# Patient Record
Sex: FEMALE | Race: WHITE | NOT HISPANIC OR LATINO | URBAN - METROPOLITAN AREA
[De-identification: names, ages, dates, MRNs, and addresses within clinical notes are randomized per-mention and may not be internally consistent; named-entity substitution may affect disease eponyms.]

---

## 2023-09-11 PROBLEM — M21.612 BUNION OF GREAT TOE OF LEFT FOOT: Status: ACTIVE | Noted: 2023-09-11

## 2023-09-11 PROBLEM — R29.898 LIMB WEAKNESS: Status: ACTIVE | Noted: 2023-09-11

## 2023-09-11 PROBLEM — G43.109 MIGRAINE WITH AURA AND WITHOUT STATUS MIGRAINOSUS, NOT INTRACTABLE: Status: ACTIVE | Noted: 2023-09-11

## 2023-09-11 PROBLEM — N91.0 DELAYED MENSES: Status: ACTIVE | Noted: 2023-09-11

## 2023-09-11 PROBLEM — L70.0 ACNE VULGARIS: Status: ACTIVE | Noted: 2023-09-11

## 2023-09-11 PROBLEM — M19.079 ARTHRITIS OF BIG TOE: Status: ACTIVE | Noted: 2023-09-11

## 2023-09-11 PROBLEM — R90.89 ABNORMAL BRAIN MRI: Status: ACTIVE | Noted: 2023-09-11

## 2023-09-11 PROBLEM — J45.990 EXERCISE-INDUCED ASTHMA (HHS-HCC): Status: ACTIVE | Noted: 2023-09-11

## 2023-09-11 PROBLEM — R40.0 DAYTIME SLEEPINESS: Status: ACTIVE | Noted: 2023-09-11

## 2023-09-11 PROBLEM — R20.0 NUMBNESS OF ARM: Status: ACTIVE | Noted: 2023-09-11

## 2023-09-11 PROBLEM — F32.9 REACTIVE DEPRESSION: Status: ACTIVE | Noted: 2023-09-11

## 2023-09-11 PROBLEM — I80.9 SUPERFICIAL THROMBOPHLEBITIS: Status: ACTIVE | Noted: 2023-09-11

## 2023-09-11 PROBLEM — B99.9 INFECTION: Status: ACTIVE | Noted: 2023-09-11

## 2023-09-11 RX ORDER — ETONOGESTREL 68 MG/1
IMPLANT SUBCUTANEOUS
COMMUNITY

## 2023-09-11 RX ORDER — ALBUTEROL SULFATE 90 UG/1
2 AEROSOL, METERED RESPIRATORY (INHALATION)
COMMUNITY
End: 2023-10-11 | Stop reason: WASHOUT

## 2023-09-11 RX ORDER — SERTRALINE HYDROCHLORIDE 200 MG/1
1 CAPSULE ORAL DAILY
COMMUNITY
End: 2023-10-11 | Stop reason: WASHOUT

## 2023-10-10 ENCOUNTER — TELEPHONE (OUTPATIENT)
Dept: OBSTETRICS AND GYNECOLOGY | Facility: CLINIC | Age: 23
End: 2023-10-10
Payer: COMMERCIAL

## 2023-10-10 NOTE — TELEPHONE ENCOUNTER
Pt called into office stating she has been experiencing Dysuria and frequency. Pt also has a new partner and needs an STD screening. LPN scheduled pt apt for 10/11 for dip and std screening.

## 2023-10-11 ENCOUNTER — OFFICE VISIT (OUTPATIENT)
Dept: OBSTETRICS AND GYNECOLOGY | Facility: CLINIC | Age: 23
End: 2023-10-11
Payer: COMMERCIAL

## 2023-10-11 VITALS
BODY MASS INDEX: 24.63 KG/M2 | HEIGHT: 63 IN | DIASTOLIC BLOOD PRESSURE: 62 MMHG | WEIGHT: 139 LBS | SYSTOLIC BLOOD PRESSURE: 118 MMHG

## 2023-10-11 DIAGNOSIS — N92.6 IRREGULAR MENSES: ICD-10-CM

## 2023-10-11 DIAGNOSIS — Z72.89 OTHER PROBLEMS RELATED TO LIFESTYLE: ICD-10-CM

## 2023-10-11 DIAGNOSIS — R30.0 DYSURIA: ICD-10-CM

## 2023-10-11 DIAGNOSIS — Z12.4 SCREENING FOR CERVICAL CANCER: Primary | ICD-10-CM

## 2023-10-11 DIAGNOSIS — Z11.3 SCREEN FOR SEXUALLY TRANSMITTED DISEASES: ICD-10-CM

## 2023-10-11 DIAGNOSIS — Z30.09 CONTRACEPTIVE EDUCATION: ICD-10-CM

## 2023-10-11 LAB
POC APPEARANCE, URINE: CLEAR
POC BILIRUBIN, URINE: NEGATIVE
POC BLOOD, URINE: ABNORMAL
POC COLOR, URINE: YELLOW
POC GLUCOSE, URINE: NEGATIVE MG/DL
POC KETONES, URINE: NEGATIVE MG/DL
POC LEUKOCYTES, URINE: ABNORMAL
POC NITRITE,URINE: NEGATIVE
POC PH, URINE: 6 PH
POC PROTEIN, URINE: ABNORMAL MG/DL
POC SPECIFIC GRAVITY, URINE: >=1.03
POC UROBILINOGEN, URINE: 0.2 EU/DL
PREGNANCY TEST URINE, POC: NEGATIVE

## 2023-10-11 PROCEDURE — 81025 URINE PREGNANCY TEST: CPT | Performed by: OBSTETRICS & GYNECOLOGY

## 2023-10-11 PROCEDURE — 99214 OFFICE O/P EST MOD 30 MIN: CPT | Performed by: OBSTETRICS & GYNECOLOGY

## 2023-10-11 PROCEDURE — 1036F TOBACCO NON-USER: CPT | Performed by: OBSTETRICS & GYNECOLOGY

## 2023-10-11 PROCEDURE — 88175 CYTOPATH C/V AUTO FLUID REDO: CPT | Mod: TC,GCY | Performed by: OBSTETRICS & GYNECOLOGY

## 2023-10-11 PROCEDURE — 87800 DETECT AGNT MULT DNA DIREC: CPT | Performed by: OBSTETRICS & GYNECOLOGY

## 2023-10-11 PROCEDURE — 87086 URINE CULTURE/COLONY COUNT: CPT | Performed by: OBSTETRICS & GYNECOLOGY

## 2023-10-11 PROCEDURE — 81003 URINALYSIS AUTO W/O SCOPE: CPT | Mod: QW | Performed by: OBSTETRICS & GYNECOLOGY

## 2023-10-11 PROCEDURE — 88141 CYTOPATH C/V INTERPRET: CPT | Performed by: PATHOLOGY

## 2023-10-11 RX ORDER — SERTRALINE HYDROCHLORIDE 100 MG/1
50 TABLET, FILM COATED ORAL DAILY
COMMUNITY
Start: 2023-09-07 | End: 2024-01-15

## 2023-10-11 RX ORDER — NITROFURANTOIN 25; 75 MG/1; MG/1
100 CAPSULE ORAL ONCE
Status: DISCONTINUED | OUTPATIENT
Start: 2023-10-11 | End: 2023-10-11

## 2023-10-11 RX ORDER — NITROFURANTOIN 25; 75 MG/1; MG/1
100 CAPSULE ORAL 2 TIMES DAILY
Qty: 14 CAPSULE | Refills: 0 | Status: SHIPPED | OUTPATIENT
Start: 2023-10-11 | End: 2023-10-18

## 2023-10-11 ASSESSMENT — ENCOUNTER SYMPTOMS
DIZZINESS: 0
UNEXPECTED WEIGHT CHANGE: 0
FATIGUE: 0
WEAKNESS: 0
COLOR CHANGE: 0
CHEST TIGHTNESS: 0
JOINT SWELLING: 0
SHORTNESS OF BREATH: 0
ABDOMINAL DISTENTION: 0
DIFFICULTY URINATING: 0
ACTIVITY CHANGE: 0
DYSURIA: 0
ABDOMINAL PAIN: 0
HEADACHES: 0
ADENOPATHY: 0

## 2023-10-11 ASSESSMENT — LIFESTYLE VARIABLES
HOW OFTEN DURING THE LAST YEAR HAVE YOU HAD A FEELING OF GUILT OR REMORSE AFTER DRINKING: NEVER
HOW OFTEN DURING THE LAST YEAR HAVE YOU NEEDED AN ALCOHOLIC DRINK FIRST THING IN THE MORNING TO GET YOURSELF GOING AFTER A NIGHT OF HEAVY DRINKING: NEVER
AUDIT TOTAL SCORE: 2
HAS A RELATIVE, FRIEND, DOCTOR, OR ANOTHER HEALTH PROFESSIONAL EXPRESSED CONCERN ABOUT YOUR DRINKING OR SUGGESTED YOU CUT DOWN: NO
SKIP TO QUESTIONS 9-10: 1
HOW MANY STANDARD DRINKS CONTAINING ALCOHOL DO YOU HAVE ON A TYPICAL DAY: 1 OR 2
HOW OFTEN DO YOU HAVE A DRINK CONTAINING ALCOHOL: 2-4 TIMES A MONTH
AUDIT-C TOTAL SCORE: 2
HOW OFTEN DO YOU HAVE SIX OR MORE DRINKS ON ONE OCCASION: NEVER
HOW OFTEN DURING THE LAST YEAR HAVE YOU FOUND THAT YOU WERE NOT ABLE TO STOP DRINKING ONCE YOU HAD STARTED: NEVER
HOW OFTEN DURING THE LAST YEAR HAVE YOU FAILED TO DO WHAT WAS NORMALLY EXPECTED FROM YOU BECAUSE OF DRINKING: NEVER
HOW OFTEN DURING THE LAST YEAR HAVE YOU BEEN UNABLE TO REMEMBER WHAT HAPPENED THE NIGHT BEFORE BECAUSE YOU HAD BEEN DRINKING: NEVER
HAVE YOU OR SOMEONE ELSE BEEN INJURED AS A RESULT OF YOUR DRINKING: NO

## 2023-10-11 ASSESSMENT — PATIENT HEALTH QUESTIONNAIRE - PHQ9
SUM OF ALL RESPONSES TO PHQ9 QUESTIONS 1 & 2: 0
1. LITTLE INTEREST OR PLEASURE IN DOING THINGS: NOT AT ALL
2. FEELING DOWN, DEPRESSED OR HOPELESS: NOT AT ALL

## 2023-10-11 NOTE — PROGRESS NOTES
"Annual  Subjective   Cynthia Brooks is a 23 y.o. female who is here for std eval due to new partner; no specific sxs.   Complaints: Having UTI symptoms of urinary frequency, burning and saw blood in the urine  Periods: Irregular light sheds on Nexplanon dysmenorrhea: none    Current contraception: Nexplanon   history of abnormal Pap smear: no  History of abnormal mammogram: no      OB History    No obstetric history on file.          Review of Systems   Constitutional:  Negative for activity change, fatigue and unexpected weight change.   Respiratory:  Negative for chest tightness and shortness of breath.    Cardiovascular:  Negative for chest pain and leg swelling.   Gastrointestinal:  Negative for abdominal distention and abdominal pain.   Genitourinary:  Negative for difficulty urinating, dysuria, genital sores, pelvic pain, vaginal bleeding, vaginal discharge and vaginal pain.   Musculoskeletal:  Negative for gait problem and joint swelling.   Skin:  Negative for color change and rash.   Neurological:  Negative for dizziness, weakness and headaches.   Hematological:  Negative for adenopathy.       Objective   /62   Ht 1.6 m (5' 3\")   Wt 63 kg (139 lb)   LMP 09/27/2023 (Approximate)   BMI 24.62 kg/m²        General:   Alert and oriented, in no acute distress   Neck: Supple. No visible thyromegaly.    Breast/Axilla: Normal to palpation bilaterally without masses, skin changes, or nipple discharge.    Abdomen: Soft, non-tender, without masses or organomegaly   Vulva: Normal architecture without erythema, masses, or lesions.    Vagina: Normal mucosa without lesions, masses, or atrophy.  Moderate amount of thick white  vaginal discharge.    Cervix: Normal without masses, lesions, or signs of cervicitis; Pap obtained   Uterus: Normal, mobile, non-enlarged uterus   Adnexa: Normal without masses or lesions   Pelvic Floor normal   Psych Normal affect. Normal mood.      Assessment/Plan   Encounter Diagnoses "   Name Primary?    Screening for cervical cancer; baseline pap due Yes    Screen for sexually transmitted diseases; added test to pap     Other problems related to lifestyle; as above     Irregular menses; expected on Nexplanon     Dysuria; pt requests empiric tx due to sxs     Contraceptive education; reviewed menstrual changes with larc         Mirta Hughes MD

## 2023-10-12 ENCOUNTER — TELEPHONE (OUTPATIENT)
Dept: OBSTETRICS AND GYNECOLOGY | Facility: CLINIC | Age: 23
End: 2023-10-12
Payer: COMMERCIAL

## 2023-10-12 NOTE — TELEPHONE ENCOUNTER
Est pt last seen 10/11/2023 Annual / pt calling update pharm / requesting to have Atb that was ordered yesterday to be called to [facility] [address] / Pharm updated / Macrobid 100 mg 1 po bid x 7 days qty 14 no refills called to updated [facility] .

## 2023-10-13 LAB
BACTERIA UR CULT: NORMAL
C TRACH RRNA SPEC QL NAA+PROBE: NEGATIVE
N GONORRHOEA DNA SPEC QL PROBE+SIG AMP: NEGATIVE

## 2023-10-25 LAB
CYTOLOGY CMNT CVX/VAG CYTO-IMP: NORMAL
LAB AP CONTRACEPTIVE HISTORY: NORMAL
LAB AP HPV GENOTYPE QUESTION: YES
LAB AP HPV HR: NORMAL
LAB AP PAP ADDITIONAL TESTS: NORMAL
LABORATORY COMMENT REPORT: NORMAL
LMP START DATE: NORMAL
MENSTRUAL HX REPORTED: NORMAL
PATH REPORT.TOTAL CANCER: NORMAL

## 2023-11-17 ENCOUNTER — OFFICE VISIT (OUTPATIENT)
Dept: OBSTETRICS AND GYNECOLOGY | Facility: CLINIC | Age: 23
End: 2023-11-17
Payer: COMMERCIAL

## 2023-11-17 VITALS
BODY MASS INDEX: 24.98 KG/M2 | DIASTOLIC BLOOD PRESSURE: 64 MMHG | SYSTOLIC BLOOD PRESSURE: 118 MMHG | HEIGHT: 63 IN | WEIGHT: 141 LBS

## 2023-11-17 DIAGNOSIS — N92.6 IRREGULAR MENSES: ICD-10-CM

## 2023-11-17 DIAGNOSIS — Z30.09 CONTRACEPTIVE EDUCATION: ICD-10-CM

## 2023-11-17 DIAGNOSIS — N89.8 VAGINAL DISCHARGE: Primary | ICD-10-CM

## 2023-11-17 LAB — PREGNANCY TEST URINE, POC: NEGATIVE

## 2023-11-17 PROCEDURE — 87205 SMEAR GRAM STAIN: CPT | Performed by: OBSTETRICS & GYNECOLOGY

## 2023-11-17 PROCEDURE — 99213 OFFICE O/P EST LOW 20 MIN: CPT | Performed by: OBSTETRICS & GYNECOLOGY

## 2023-11-17 PROCEDURE — 1036F TOBACCO NON-USER: CPT | Performed by: OBSTETRICS & GYNECOLOGY

## 2023-11-17 NOTE — PROGRESS NOTES
Indication: ***    Procedure: After an informed consent was obtained. She was then positioned in the dorsal lithotomy position. A coated speculum was placed and the cervix was visualized.   Prep= Betasept. Anesthesia= 2% lidocaine w epi.   A  size ......loop electrode was used to excise the sessile polyp from the cervix.  Hemostasis with cautery and Monsel's .  EBL=  Pt tolerated ..

## 2023-11-17 NOTE — PROGRESS NOTES
"Subjective:   Cynthia Brooks, a 23 year old female, presents for vaginal discharge and vulvar irritation.   Using Nexplanon insert as bcm since 03/31/2022. gets irreg sheds.  Complaints: Pinky, odorous discharge causing concern. Vulvar and labial itching. Both of which that have resolved. Denies any new partners or concern for STD exposure.   Contraceptions: Satisfied with Nexplanon, not spotting most days of the month. Still experiencing PMS symptoms.  Objective:  Visit Vitals  /64   Ht 1.6 m (5' 3\")   Wt 64 kg (141 lb)   LMP 11/01/2023 (Exact Date)   BMI 24.98 kg/m²   OB Status Having periods   Smoking Status Never   BSA 1.69 m²    Review of Systems    Objective   /64   Ht 1.6 m (5' 3\")   Wt 64 kg (141 lb)   LMP 11/01/2023 (Exact Date)   BMI 24.98 kg/m²        General:   Alert and oriented, in no acute distress   Neck: Supple. No visible thyromegaly.    Breast/Axilla:    Abdomen:    Vulva: Normal architecture without erythema, masses, or lesions.    Vagina: Normal mucosa without lesions, masses, or atrophy. No abnormal vaginal discharge.    Cervix: Normal without masses, lesions, or signs of cervicitis   Uterus: Normal, mobile, non-enlarged uterus   Adnexa: Normal without masses or lesions   Pelvic Floor normal   Psych Normal affect. Normal mood.      Assessment/Plan    Encounter Diagnoses   Name Primary?    Irregular menses; explained using larc options for bcm often results in irreg sheds; replacement due 03/2024.     Vaginal discharge; current exam neg for visible infection. Yes    Contraceptive education; rvwd cyclic vs larc bcm and impact on bleeding.       Mirta Hughes MD  "

## 2023-11-19 LAB
CLUE CELLS VAG LPF-#/AREA: ABNORMAL /[LPF]
NUGENT SCORE: 2
YEAST VAG WET PREP-#/AREA: PRESENT

## 2024-01-11 ENCOUNTER — TELEPHONE (OUTPATIENT)
Dept: OTHER | Age: 24
End: 2024-01-11
Payer: COMMERCIAL

## 2024-01-11 NOTE — TELEPHONE ENCOUNTER
PATIENT STATED THAT DOCTOR REGINALD VALENCIA STATED SHE WOULD BE ABLE TO REFILL sertraline (Zoloft) 100 mg tablet  FOR HER.

## 2024-01-13 DIAGNOSIS — F32.9 REACTIVE DEPRESSION: ICD-10-CM

## 2024-01-15 RX ORDER — SERTRALINE HYDROCHLORIDE 100 MG/1
150 TABLET, FILM COATED ORAL DAILY
Qty: 45 TABLET | Refills: 0 | Status: SHIPPED | OUTPATIENT
Start: 2024-01-15 | End: 2024-03-21

## 2024-02-05 ENCOUNTER — TELEMEDICINE (OUTPATIENT)
Dept: BEHAVIORAL HEALTH | Facility: CLINIC | Age: 24
End: 2024-02-05
Payer: COMMERCIAL

## 2024-02-05 DIAGNOSIS — F41.1 GAD (GENERALIZED ANXIETY DISORDER): ICD-10-CM

## 2024-02-05 PROCEDURE — 99214 OFFICE O/P EST MOD 30 MIN: CPT | Performed by: PSYCHIATRY & NEUROLOGY

## 2024-02-05 NOTE — PROGRESS NOTES
"Outpatient Child and Adolescent Psychiatry      Subjective   Cynthia Brooks, a 23 y.o. female, for Follow-up visit.      Assessment/Plan   Diagnosis:   Patient Active Problem List   Diagnosis    Abnormal brain MRI    Acne vulgaris    Arthritis of big toe    Bunion of great toe of left foot    Daytime sleepiness    Delayed menses    Exercise-induced asthma    Infection    Limb weakness    Migraine with aura and without status migrainosus, not intractable    Numbness of arm    Reactive depression    Superficial thrombophlebitis    Contraceptive education    Other problems related to lifestyle    Irregular menses    Dysuria    Screening for cervical cancer       Treatment Goals:  Specify outcomes written in observable, behavioral terms:   Anxiety management    Treatment Plan/Recommendations:   Inc Zoloft 100 mg QD targetting anxiety and depression.  Cont therapy.  Follow-up plan for depression was discussed with patient.    Reason for Visit:       HPI:   Last seen 8/2023, has been feeling \"stressed lot more\". Temper is shorter. Goes to [employer], lives with a room mate in an apartment. Has been irritable recently- doesn't have patience, if her key gets stuck and gets mad. Trigger usually is roommate. Relationship with room mate is a trigger. Used to be best friends with this person- does things that irritates her. Vacuums  her room without asking. Is loud. Uses some copin strategies. Is stay out as much as she can.    Has been taking medication regularly. Hasn't been feeling depressed. Medical doctor helping her with TMJ and sleep apnea- lowered  her zoloft to 50 mg.     Current Medications:    Current Outpatient Medications:     etonogestrel-eluting contraceptive (Nexplanon) 68 mg implant implant, as directed Subcutaneous, Disp: , Rfl:     sertraline (Zoloft) 100 mg tablet, TAKE 1 & 1/2 TABLET BY MOUTH EVERY DAY, Disp: 45 tablet, Rfl: 0    Record Review: brief     Medical Review Of Systems:  A comprehensive review " "of systems was negative.    Psychiatric Review Of Systems:  Depressive Symptoms:denies feeling depressed  Anxiety Symptoms: rates anxiety 8/10  Inattentive Symptoms: low concerns, can focus when she tries  Motivation- low   Sleep- 6-7 hrs  Appetite- good, 2 meals a day         Objective     Mental Status Exam:   MSE:  Appearance: Appears stated age. Wearing street clothes with fair grooming and hygiene.  Behavior: Calm, cooperative. Appropriate eye contact.  Speech: Normal rate, rhythm and volume.  Motor: No PMA or PMR. No abnormal movements noted.  Mood: \"Fine\"  Affect:  anxious  Thought Process: Linear, logical and goal oriented. Associations are logical.  Thought Content: Does not endorse suicidal or homicidal ideation, no delusions elicited  Perception: Does not endorse auditory or visual hallucinations, does  not appear to be responding to hallucinatory stimuli  Cognition: Alert and oriented x 3, concentration fair, adequate fund of knowledge. Language intact.  Insight: Fair, in regards to mental illness  Judgment: Fair, in regards to ability to make sound decision        Review with patient: Treatment plan reviewed with the patient.  Medication risks/benefit reviewed with the patient    Time spent in therapy 10  Total time spent 30    Falguni Smith MD    "

## 2024-02-18 DIAGNOSIS — F32.9 REACTIVE DEPRESSION: ICD-10-CM

## 2024-02-23 RX ORDER — SERTRALINE HYDROCHLORIDE 100 MG/1
150 TABLET, FILM COATED ORAL DAILY
Qty: 45 TABLET | Refills: 0 | OUTPATIENT
Start: 2024-02-23

## 2024-03-04 ENCOUNTER — TELEPHONE (OUTPATIENT)
Dept: OBSTETRICS AND GYNECOLOGY | Facility: CLINIC | Age: 24
End: 2024-03-04
Payer: COMMERCIAL

## 2024-03-04 NOTE — TELEPHONE ENCOUNTER
Est pt last seen 11/17/2023 vag dis /Pt calling states that she is having uti sx / burning sensation with urination  / frequency / pt denies any new partners / advised Macrobid  100 mg 1 po bid for 7 days / if not feeling better in 4-5 days need to call office for appt / urine specimen / pt agrees / to increase water intake / cranberry juice / To finish all medication even if feeling better pt agrees  /  Macrobid 100 mg 1 po bid x 7 days qty 14 no refills called [facility] listed

## 2024-06-10 ENCOUNTER — TELEPHONE (OUTPATIENT)
Dept: OBSTETRICS AND GYNECOLOGY | Facility: CLINIC | Age: 24
End: 2024-06-10
Payer: COMMERCIAL

## 2024-06-10 NOTE — TELEPHONE ENCOUNTER
Est pt last seen 06/06/2023 irreg bleeding/ Annual sched for 06/12/2024 / pt calling requesting to have Nexplanon removed at annual appt / explained that Annual sched for only 15 min and would need longer appt to remove Nexpalnon / Nexplanon removal sched for 08/06/2024 / pt will keep annual appt 06/12/2024 / advised pt that at annual appt will looked for cancellation to move up Nexplanon removal .

## 2024-06-12 ENCOUNTER — OFFICE VISIT (OUTPATIENT)
Dept: OBSTETRICS AND GYNECOLOGY | Facility: CLINIC | Age: 24
End: 2024-06-12
Payer: COMMERCIAL

## 2024-06-12 VITALS
WEIGHT: 149 LBS | BODY MASS INDEX: 25.44 KG/M2 | SYSTOLIC BLOOD PRESSURE: 108 MMHG | HEIGHT: 64 IN | DIASTOLIC BLOOD PRESSURE: 72 MMHG

## 2024-06-12 DIAGNOSIS — Z01.419 VISIT FOR GYNECOLOGIC EXAMINATION: Primary | ICD-10-CM

## 2024-06-12 DIAGNOSIS — G43.001 MIGRAINE WITHOUT AURA AND WITH STATUS MIGRAINOSUS, NOT INTRACTABLE: ICD-10-CM

## 2024-06-12 DIAGNOSIS — Z30.46 SURVEILLANCE OF IMPLANTABLE SUBDERMAL CONTRACEPTIVE: ICD-10-CM

## 2024-06-12 DIAGNOSIS — L70.0 ACNE VULGARIS: ICD-10-CM

## 2024-06-12 DIAGNOSIS — Z30.09 CONTRACEPTIVE EDUCATION: ICD-10-CM

## 2024-06-12 DIAGNOSIS — N92.6 IRREGULAR MENSES: ICD-10-CM

## 2024-06-12 PROCEDURE — 1036F TOBACCO NON-USER: CPT | Performed by: OBSTETRICS & GYNECOLOGY

## 2024-06-12 PROCEDURE — 99395 PREV VISIT EST AGE 18-39: CPT | Performed by: OBSTETRICS & GYNECOLOGY

## 2024-06-12 ASSESSMENT — PATIENT HEALTH QUESTIONNAIRE - PHQ9
2. FEELING DOWN, DEPRESSED OR HOPELESS: NOT AT ALL
SUM OF ALL RESPONSES TO PHQ9 QUESTIONS 1 & 2: 0
1. LITTLE INTEREST OR PLEASURE IN DOING THINGS: NOT AT ALL

## 2024-06-12 ASSESSMENT — LIFESTYLE VARIABLES
HOW OFTEN DO YOU HAVE A DRINK CONTAINING ALCOHOL: 2-3 TIMES A WEEK
AUDIT-C TOTAL SCORE: 3
HOW MANY STANDARD DRINKS CONTAINING ALCOHOL DO YOU HAVE ON A TYPICAL DAY: 1 OR 2
HOW OFTEN DO YOU HAVE SIX OR MORE DRINKS ON ONE OCCASION: NEVER
SKIP TO QUESTIONS 9-10: 1

## 2024-06-12 ASSESSMENT — ENCOUNTER SYMPTOMS
DYSURIA: 0
HEADACHES: 0
DIFFICULTY URINATING: 0
WEAKNESS: 0
CHEST TIGHTNESS: 0
OCCASIONAL FEELINGS OF UNSTEADINESS: 0
DIZZINESS: 0
DEPRESSION: 0
COLOR CHANGE: 0
ABDOMINAL DISTENTION: 0
SHORTNESS OF BREATH: 0
JOINT SWELLING: 0
ABDOMINAL PAIN: 0
ACTIVITY CHANGE: 0
FATIGUE: 0
ADENOPATHY: 0
UNEXPECTED WEIGHT CHANGE: 0
LOSS OF SENSATION IN FEET: 0

## 2024-06-12 ASSESSMENT — PAIN SCALES - GENERAL: PAINLEVEL: 0-NO PAIN

## 2024-06-12 NOTE — PROGRESS NOTES
"Arrival=late to appt time.  Subjective   Cynthia Brooks is a 23 y.o. female who is here for a routine exam.   Complaints:  Freq bleeding on  Nexplanon= 11  episodes in 6 months; wanting different option; choices limited by hx menstrual h/a with estrogen use.   Nexplanon placed  3/31/22; hx h/a on ocps.  PMHx: Less freq headache off ocp. Had MRI eval pos for stable demyelination from 2020.       anxiety, depression, sees Dr Alli Harley/[facility]      Legs numbness and tingling.  SurgHx: Denies   Father: alive, Mother: alive,   Living situation: Lives with:, Parents, Sister.  Occupation: [employer]; works in Marketing; drives to [employer]   Last pap -baseline due  Birth control Nexplanon; placed 3/31/22.   Menarche 16. STDs--declines testing 2024;  04/2023-neg (getting done at school per pt).   gardasil yes 3x. currently sexually active/same partner; denies concerns.   Total preg   0.    Review of Systems   Constitutional:  Negative for activity change, fatigue and unexpected weight change.   Respiratory:  Negative for chest tightness and shortness of breath.    Cardiovascular:  Negative for chest pain and leg swelling.   Gastrointestinal:  Negative for abdominal distention and abdominal pain.   Genitourinary:  Negative for difficulty urinating, dysuria, genital sores, pelvic pain, vaginal bleeding, vaginal discharge and vaginal pain.   Musculoskeletal:  Negative for gait problem and joint swelling.   Skin:  Negative for color change and rash.   Neurological:  Negative for dizziness, weakness and headaches.   Hematological:  Negative for adenopathy.   Objective Visit Vitals  /72   Ht 1.626 m (5' 4\")   Wt 67.6 kg (149 lb)   LMP 06/08/2024 (Exact Date) Comment: irregular periods   BMI 25.58 kg/m²   OB Status Having periods   Smoking Status Never   BSA 1.75 m²       General:   Alert and oriented, in no acute distress   Neck: Supple. No visible thyromegaly.    Breast/Axilla: Normal to palpation " bilaterally without masses, skin changes, or nipple discharge.    Abdomen: Soft, non-tender, without masses or organomegaly   Vulva: Normal architecture without erythema, masses, or lesions.    Vagina: Normal mucosa without lesions, masses, or atrophy. No abnormal vaginal discharge.    Cervix: Normal without masses, lesions, or signs of cervicitis; pap sent   Uterus: Normal, mobile, non-enlarged uterus   Adnexa: Normal without masses or lesions   Pelvic Floor normal   Psych Normal affect. Normal mood.    Assessment/Plan   Encounter Diagnoses   Name Primary?    Visit for gynecologic examination; grossly nl  breast/gyn exams. Yes    Contraceptive education; rvwd prog-only options; advised trial Slynd for less btb, tx acne. Explained hosp pharmacy can submit PA.     Irregular menses; plan removal  implant and start pop.     Surveillance of implantable subdermal contraceptive     Migraine without aura and with status migrainosus, not intractable; worse on SNEHA.     Acne vulgaris; increased on Nexplanon.    Mirta Hughes MD

## 2024-06-25 LAB
CYTOLOGY CMNT CVX/VAG CYTO-IMP: NORMAL
HPV HR 12 DNA GENITAL QL NAA+PROBE: NEGATIVE
HPV HR GENOTYPES PNL CVX NAA+PROBE: NEGATIVE
HPV16 DNA SPEC QL NAA+PROBE: NEGATIVE
HPV18 DNA SPEC QL NAA+PROBE: NEGATIVE
LAB AP CONTRACEPTIVE HISTORY: NORMAL
LAB AP HPV GENOTYPE QUESTION: YES
LAB AP HPV HR: NORMAL
LABORATORY COMMENT REPORT: NORMAL
LABORATORY COMMENT REPORT: NORMAL
LMP START DATE: NORMAL
MENSTRUAL HX REPORTED: NORMAL
PATH REPORT.TOTAL CANCER: NORMAL

## 2024-07-16 ENCOUNTER — APPOINTMENT (OUTPATIENT)
Dept: BEHAVIORAL HEALTH | Facility: CLINIC | Age: 24
End: 2024-07-16
Payer: COMMERCIAL

## 2024-07-16 DIAGNOSIS — F32.9 REACTIVE DEPRESSION: ICD-10-CM

## 2024-07-16 PROCEDURE — 99213 OFFICE O/P EST LOW 20 MIN: CPT | Performed by: PSYCHIATRY & NEUROLOGY

## 2024-07-16 RX ORDER — SERTRALINE HYDROCHLORIDE 100 MG/1
100 TABLET, FILM COATED ORAL DAILY
Qty: 90 TABLET | Refills: 0 | Status: SHIPPED | OUTPATIENT
Start: 2024-07-16 | End: 2024-10-14

## 2024-07-16 NOTE — PROGRESS NOTES
Outpatient Child and Adolescent Psychiatry      Subjective   Cynthia Brooks, a 23 y.o. female, for Follow-up visit.      Assessment/Plan   Diagnosis:   Patient Active Problem List   Diagnosis    Abnormal brain MRI    Acne vulgaris    Arthritis of big toe    Bunion of great toe of left foot    Daytime sleepiness    Delayed menses    Exercise-induced asthma (HHS-HCC)    Infection    Limb weakness    Migraine with aura and without status migrainosus, not intractable    Numbness of arm    Reactive depression    Superficial thrombophlebitis    Surveillance of implantable subdermal contraceptive    Other problems related to lifestyle    Irregular menses    Dysuria    Screening for cervical cancer       Treatment Goals:  Specify outcomes written in observable, behavioral terms:   anxiety    Treatment Plan/Recommendations:   Cont Zoloft 100 mg QD targetting anxiety and depression.  Cont therapy.  Follow-up plan for depression was discussed with patient.      Reason for Visit:       HPI:     Reports stable mood and depression. Denies any SI. Compliant with medications. Doesnt report any side effects. Using coping strategies to help with stressful moments.    Moved out and moved in parents  Tolerating higher dose    Current Medications:    Current Outpatient Medications:     drospirenone, contraceptive, 4 mg (28) tablet, Take 1 tablet by mouth once daily., Disp: 84 tablet, Rfl: 3    etonogestrel-eluting contraceptive (Nexplanon) 68 mg implant implant, as directed Subcutaneous, Disp: , Rfl:     sertraline (Zoloft) 100 mg tablet, TAKE 1 AND 1/2 TABLETS BY MOUTH DAILY, Disp: 135 tablet, Rfl: 0    Record Review: brief     Medical Review Of Systems:  A comprehensive review of systems was negative.    Psychiatric Review Of Systems:  Sleep-good  Appetite- good  Anxiety- improved       Objective     Mental Status Exam:   MSE:  Appearance: Appears stated age. Wearing street clothes with fair grooming and hygiene.  Behavior: Calm,  "cooperative. Appropriate eye contact.  Speech: Normal rate, rhythm and volume.  Motor: No PMA or PMR. No abnormal movements noted.  Mood: \"Fine\"  Affect:  euthymic  Thought Process: Linear, logical and goal oriented. Associations are logical.  Thought Content: Does not endorse suicidal or homicidal ideation, no delusions elicited  Perception: Does not endorse auditory or visual hallucinations, does  not appear to be responding to hallucinatory stimuli  Cognition: Alert and oriented x 3, concentration fair, adequate fund of knowledge. Language intact.  Insight: Fair, in regards to mental illness  Judgment: Fair, in regards to ability to make sound decision          Review with patient: Treatment plan reviewed with the patient.  Medication risks/benefit reviewed with the patient    Time spent in therapy 10  Total time spent 20    Falguni Smith MD    "

## 2024-07-25 NOTE — PROGRESS NOTES
Facial Plastic & Reconstructive Surgery    Reason for consult: Nasal obstruction    Referring provider: Dr. Duque, TMJ    Chief Complaint: Obstructed breathing    Constant, present year round, does not fluctuate. It affects the patients ability to sleep, exercise, and is troubling during the day.  Symptoms began: years ago    Nasal steroid or spray: has attempted > 6 weeks without benefit    Site of obstruction: bilateral    Previous Provider: Dr. Duque  Previous documentation for this patient was extensively reviewed including specific reasons for evaluation. Complex nature of complaint and medical issues prompting evaluation for surgical consideration by facial plastic & reconstructive surgeon.    Previous surgery: Denies    Previous CT/MRI imaging of the nasal cavity and sinuses:  I personally reviewed the CBCT from Dr. Duque and this is my impression: nasal valve narrowing left > right, septal deviation to the left          Trauma history: denies    Sleep apnea or snoring history: denies    Allergic rhinitis, sinusitis history: denies    Smoking: denies    Aesthetic concern: dorsal hump    Past Medical History  She has a past medical history of Anxiety and depression, Headache, and Numbness and tingling of both legs.    Surgical History  She has a past surgical history that includes MR angio head wo IV contrast (12/5/2019).     Social History  She reports that she has never smoked. She has never used smokeless tobacco. She reports current alcohol use of about 2.0 standard drinks of alcohol per week. She reports that she does not currently use drugs after having used the following drugs: Marijuana.    Family History  Family History   Problem Relation Name Age of Onset    No Known Problems Mother      No Known Problems Father      No Known Problems Sister      Heart disease Maternal Grandfather      Heart attack Maternal Grandfather  60    Hypertension Paternal Grandmother      Diabetes Paternal Grandmother       Skin cancer Paternal Grandmother      Cancer Paternal Grandfather      Hypertension Paternal Grandfather      Diabetes Paternal Grandfather      Other (renal tumor) Paternal Grandfather          Allergies  Patient has no known allergies.    Physical exam    General: Well-developed and well-nourished in appearance.  Skin: No rashes or concerning lesions on the visible portions of the skin.  Eyes: Extraocular movements intact. Visual fields grossly normal.  Ears: Pinna are normal in shape and position. External canals are patent.  Oral Cavity/Oropharynx: Dentition is intact. Mucous membranes moist. No masses or lesions.  Respiratory: No respiratory distress. Quiet breathing without stertor or stridor.  Cardiovascular: Regular rate and rhythm. Warm extremities with equal pulses.  Psych: Normal mood and affect. Judgement and insight appropriate.  Neuro: Alert and oriented. CN II-XII grossly intact. No focal deficits.  Musculoskeletal: Gait intact. Moves all extremities well without apparent deformities.    A comprehensive facial exam was performed with the following highlights:    Nasal skin type: moderate thickness    External exam:  Tip: mild bulbosity  Tip rotation: antony  Projection: antony  Dorsum: hump present  Alar facets present    Internal exam:   Septum: deviated left > right  Inferior turbinates: hypertrophied bl  Nasal valve angle: reduced left > right with dynamic narrowing of the left ala and external valve        Intranasal examination performed with limited view on anterior rhinoscopy.    Procedures:    Procedure: Rigid diagnostic nasal endoscopy  Surgeon: German Fontanez MD  Anesthesia: None  Timeout: Performed  Findings: A 0-degree rigid endoscope was passed through the patient's bilateral naris. The first pass was along the floor of the nose to the nasopharynx. The second pass was to the area of the middle meatus. The third pass was to the sphenoethmoidal recess.    Septum: Deviation is S shaped with  bilateral obstruction approximately 90% without perforations nor synechia.  Internal Nasal Valve: Angle is reduced, narrowing the nasal airway bilaterally.    Right nasal cavity:  Inferior turbinate: 2+  Inferior meatus: Clear, no discharge, no polyps/masses/lesions  Middle meatus: Clear, no discharge, no polyps/masses/lesions    Left nasal cavity:  Inferior turbinate: 2+  Inferior meatus: Clear, no discharge, no polyps/masses/lesions  Middle meatus: Clear, no discharge, no polyps/masses/lesions  Nasopharynx: Clear, no discharge, no masses/lesions    Modified Meigs Maneuver: Performed with a cotton tipped applicator, markedly improves breathing bilaterally.    Assessment - This patient has a significant mechanical nasal obstruction. The cause is multifactorial with septal deviation with severe internal nasal valve narrowing, turbinate hypertrophy, and static internal nasal valve collapse. There is some dynamic nasal valve collapse as well. Correction of this nasal obstruction will require a septoplasty, repair of nasal valve collapse with structural grafting, and a bilateral turbinate reduction. We discussed the medical necessity of this at length, as well as the risks and limitations of the procedures. All questions were answered.      Plan - Recommend septoplasty, nasal valve repair with structural grafting, and inferior turbinate reduction with lateralization. We discussed aesthetic concerns as well.

## 2024-07-29 ENCOUNTER — APPOINTMENT (OUTPATIENT)
Dept: OTOLARYNGOLOGY | Facility: CLINIC | Age: 24
End: 2024-07-29
Payer: COMMERCIAL

## 2024-07-29 VITALS — WEIGHT: 148.4 LBS | BODY MASS INDEX: 25.33 KG/M2 | HEIGHT: 64 IN

## 2024-07-29 DIAGNOSIS — J34.89 NASAL OBSTRUCTION: ICD-10-CM

## 2024-07-29 DIAGNOSIS — M95.0 NASAL DEFORMITY: Primary | ICD-10-CM

## 2024-07-29 DIAGNOSIS — R06.89 DIFFICULTY BREATHING: ICD-10-CM

## 2024-07-29 DIAGNOSIS — J34.2 DEVIATED SEPTUM: ICD-10-CM

## 2024-07-29 DIAGNOSIS — M26.69 OTHER SPECIFIED DISORDERS OF TEMPOROMANDIBULAR JOINT: ICD-10-CM

## 2024-07-29 DIAGNOSIS — J34.3 HYPERTROPHY OF INFERIOR NASAL TURBINATE: ICD-10-CM

## 2024-07-29 DIAGNOSIS — J34.2 DEVIATED NASAL SEPTUM: ICD-10-CM

## 2024-07-29 PROCEDURE — 31231 NASAL ENDOSCOPY DX: CPT | Performed by: OTOLARYNGOLOGY

## 2024-07-29 PROCEDURE — 3008F BODY MASS INDEX DOCD: CPT | Performed by: OTOLARYNGOLOGY

## 2024-07-29 PROCEDURE — 99204 OFFICE O/P NEW MOD 45 MIN: CPT | Performed by: OTOLARYNGOLOGY

## 2024-07-29 ASSESSMENT — PATIENT HEALTH QUESTIONNAIRE - PHQ9
2. FEELING DOWN, DEPRESSED OR HOPELESS: NOT AT ALL
1. LITTLE INTEREST OR PLEASURE IN DOING THINGS: NOT AT ALL
SUM OF ALL RESPONSES TO PHQ9 QUESTIONS 1 AND 2: 0

## 2024-08-06 ENCOUNTER — APPOINTMENT (OUTPATIENT)
Dept: OBSTETRICS AND GYNECOLOGY | Facility: CLINIC | Age: 24
End: 2024-08-06
Payer: COMMERCIAL

## 2024-08-20 DIAGNOSIS — M95.0 ACQUIRED DEFORMITY OF NOSE: ICD-10-CM

## 2024-08-20 DIAGNOSIS — J34.2 DEVIATED NASAL SEPTUM: ICD-10-CM

## 2024-08-20 DIAGNOSIS — J34.3 HYPERTROPHY OF NASAL TURBINATES: ICD-10-CM

## 2024-08-20 DIAGNOSIS — R09.81 NASAL CONGESTION: ICD-10-CM

## 2024-09-11 ENCOUNTER — PROCEDURE VISIT (OUTPATIENT)
Dept: OBSTETRICS AND GYNECOLOGY | Facility: CLINIC | Age: 24
End: 2024-09-11
Payer: COMMERCIAL

## 2024-09-11 VITALS
WEIGHT: 146.6 LBS | HEIGHT: 64 IN | SYSTOLIC BLOOD PRESSURE: 106 MMHG | BODY MASS INDEX: 25.03 KG/M2 | DIASTOLIC BLOOD PRESSURE: 69 MMHG

## 2024-09-11 DIAGNOSIS — Z30.46 NEXPLANON REMOVAL: Primary | ICD-10-CM

## 2024-09-11 PROCEDURE — RXMED WILLOW AMBULATORY MEDICATION CHARGE

## 2024-09-11 PROCEDURE — 11982 REMOVE DRUG IMPLANT DEVICE: CPT

## 2024-09-11 ASSESSMENT — ENCOUNTER SYMPTOMS
DEPRESSION: 0
DYSURIA: 0
OCCASIONAL FEELINGS OF UNSTEADINESS: 0
LOSS OF SENSATION IN FEET: 0
UNEXPECTED WEIGHT CHANGE: 0
FEVER: 0
VOMITING: 0
FATIGUE: 0
NAUSEA: 0
SHORTNESS OF BREATH: 0
ABDOMINAL PAIN: 0

## 2024-09-11 ASSESSMENT — PATIENT HEALTH QUESTIONNAIRE - PHQ9
2. FEELING DOWN, DEPRESSED OR HOPELESS: NOT AT ALL
1. LITTLE INTEREST OR PLEASURE IN DOING THINGS: NOT AT ALL
SUM OF ALL RESPONSES TO PHQ9 QUESTIONS 1 & 2: 0

## 2024-09-11 ASSESSMENT — LIFESTYLE VARIABLES
SKIP TO QUESTIONS 9-10: 1
AUDIT-C TOTAL SCORE: 2
HOW OFTEN DO YOU HAVE A DRINK CONTAINING ALCOHOL: 2-4 TIMES A MONTH
HOW MANY STANDARD DRINKS CONTAINING ALCOHOL DO YOU HAVE ON A TYPICAL DAY: 1 OR 2
HOW OFTEN DO YOU HAVE SIX OR MORE DRINKS ON ONE OCCASION: NEVER

## 2024-09-11 ASSESSMENT — PAIN SCALES - GENERAL: PAINLEVEL: 0-NO PAIN

## 2024-09-11 NOTE — PROGRESS NOTES
"Subjective   Cynthia Brooks is a 24 y.o. female who is here for Nexplanon removal. Last saw Dr. Hughes 24. Nexplanon placed 3/31/22; desires removal d/t frequent bleeding- within the last 6 months that is very bothersome to patient. At last visit, POP Slynd recommended as she has hx of migraines and they have worsened with SNEHA use. Pt states medication is ready at pharmacy but she has not yet started it but desires to after removal  of implant.      OB History          0    Para   0    Term   0       0    AB   0    Living   0         SAB   0    IAB   0    Ectopic   0    Multiple   0    Live Births   0                  Review of Systems   Constitutional:  Negative for fatigue, fever and unexpected weight change.   Respiratory:  Negative for shortness of breath.    Gastrointestinal:  Negative for abdominal pain, nausea and vomiting.   Genitourinary:  Negative for dysuria, menstrual problem, pelvic pain and vaginal discharge.       Objective   /69   Ht 1.626 m (5' 4\")   Wt 66.5 kg (146 lb 9.6 oz)   LMP 2024   BMI 25.16 kg/m²        General:   Alert and oriented, in no acute distress   Left upper extremity: Nexplanon implant successfully removed without complications and intact upon removal   Psych Normal affect. Normal mood.      Assessment/Plan   -Nexplanon implant successfully removed in office and intact upon removal. To begin Slynd OCP today for contraception, regular predictable bleeding pattern, and possible acne relief; avoid EE use with hx of migraines worsened by COCs. TCO if experiencing any issues or concerns with new OCP. Encouraged condom use for addtl protection for at least 7 days as backup method.     Nexplanon Removal Note    Procedure:    Implant identified.  Left upper arm prepped with Betadinex3.  1% lidocaine with epi injected at planned incision site.  A vertical incision 2-3 mm was performed with an 11-blade scalpel at the distal end of implant.  The implant " was removed using hemostat.  The implant was inspected and found to be intact and complete and was discarded.  Steri strips and then a bandaid and gauze pressure wrap dressing were applied to the site.  After removal instructions were given and verbally reviewed with the patient who acknowledged her understanding.      Difficulties with the implant removal procedure?  No    Birth control plans are Slynd POP.    Insertion of Contraceptive Capsule    Date/Time: 9/11/2024 11:57 AM    Performed by: Sylvia Zarate PA-C  Authorized by: Sylvia Zarate PA-C    Consent:     Consent obtained:  Verbal and written    Consent given by:  Patient    Procedural risks discussed:  Bleeding, possible loss of function, infection and possible continued pain    Patient questions answered: yes      Patient agrees, verbalizes understanding, and wants to proceed: yes      Instructions and paperwork completed: yes    Universal Protocol:     Patient states understanding of procedure being performed: yes      Relevant documents present and verified: yes      Site marked: yes    Indication:     Indication: Presence of non-biodegradable drug delivery implant    Pre-procedure:     Pre-procedure timeout performed: yes      Prepped with: povidone-iodine      Local anesthetic:  Lidocaine with epinephrine    The site was cleaned and prepped in a sterile fashion: yes    Procedure:     Procedure:  Removal    Small stab incision was made in arm: yes      Left/right:  Left    Visualization of implant was obtained: yes      Site was closed with steri-strips and pressure bandage applied: yes        Sylvia Zarate PA-C

## 2024-09-17 ENCOUNTER — ANESTHESIA (OUTPATIENT)
Dept: OPERATING ROOM | Facility: CLINIC | Age: 24
End: 2024-09-17
Payer: COMMERCIAL

## 2024-09-17 ENCOUNTER — ANESTHESIA EVENT (OUTPATIENT)
Dept: OPERATING ROOM | Facility: CLINIC | Age: 24
End: 2024-09-17
Payer: COMMERCIAL

## 2024-09-17 ENCOUNTER — HOSPITAL ENCOUNTER (OUTPATIENT)
Facility: CLINIC | Age: 24
Setting detail: OUTPATIENT SURGERY
Discharge: HOME | End: 2024-09-17
Attending: OTOLARYNGOLOGY | Admitting: OTOLARYNGOLOGY
Payer: COMMERCIAL

## 2024-09-17 VITALS
HEART RATE: 63 BPM | BODY MASS INDEX: 25.37 KG/M2 | OXYGEN SATURATION: 99 % | WEIGHT: 148.59 LBS | HEIGHT: 64 IN | SYSTOLIC BLOOD PRESSURE: 135 MMHG | TEMPERATURE: 96.8 F | DIASTOLIC BLOOD PRESSURE: 88 MMHG | RESPIRATION RATE: 16 BRPM

## 2024-09-17 DIAGNOSIS — M95.0 ACQUIRED DEFORMITY OF NOSE: Primary | ICD-10-CM

## 2024-09-17 DIAGNOSIS — J34.2 DEVIATED NASAL SEPTUM: ICD-10-CM

## 2024-09-17 DIAGNOSIS — G89.18 POSTOPERATIVE PAIN: ICD-10-CM

## 2024-09-17 DIAGNOSIS — J34.3 HYPERTROPHY OF NASAL TURBINATES: ICD-10-CM

## 2024-09-17 DIAGNOSIS — R09.81 NASAL CONGESTION: ICD-10-CM

## 2024-09-17 LAB — PREGNANCY TEST URINE, POC: NEGATIVE

## 2024-09-17 PROCEDURE — 96372 THER/PROPH/DIAG INJ SC/IM: CPT | Performed by: OTOLARYNGOLOGY

## 2024-09-17 PROCEDURE — 30930 THER FX NASAL INF TURBINATE: CPT | Performed by: OTOLARYNGOLOGY

## 2024-09-17 PROCEDURE — 3700000002 HC GENERAL ANESTHESIA TIME - EACH INCREMENTAL 1 MINUTE: Performed by: OTOLARYNGOLOGY

## 2024-09-17 PROCEDURE — 7100000001 HC RECOVERY ROOM TIME - INITIAL BASE CHARGE: Performed by: OTOLARYNGOLOGY

## 2024-09-17 PROCEDURE — 3600000002 HC OR TIME - INITIAL BASE CHARGE - PROCEDURE LEVEL TWO: Performed by: OTOLARYNGOLOGY

## 2024-09-17 PROCEDURE — 2500000004 HC RX 250 GENERAL PHARMACY W/ HCPCS (ALT 636 FOR OP/ED): Performed by: OTOLARYNGOLOGY

## 2024-09-17 PROCEDURE — 30520 REPAIR OF NASAL SEPTUM: CPT | Performed by: OTOLARYNGOLOGY

## 2024-09-17 PROCEDURE — 3600000007 HC OR TIME - EACH INCREMENTAL 1 MINUTE - PROCEDURE LEVEL TWO: Performed by: OTOLARYNGOLOGY

## 2024-09-17 PROCEDURE — 2500000004 HC RX 250 GENERAL PHARMACY W/ HCPCS (ALT 636 FOR OP/ED): Performed by: ANESTHESIOLOGIST ASSISTANT

## 2024-09-17 PROCEDURE — A30930 PR THERAPUTIC FRACTURE INFER TURBINATE: Performed by: ANESTHESIOLOGY

## 2024-09-17 PROCEDURE — 2500000001 HC RX 250 WO HCPCS SELF ADMINISTERED DRUGS (ALT 637 FOR MEDICARE OP): Performed by: OTOLARYNGOLOGY

## 2024-09-17 PROCEDURE — 81025 URINE PREGNANCY TEST: CPT | Performed by: OTOLARYNGOLOGY

## 2024-09-17 PROCEDURE — 3700000001 HC GENERAL ANESTHESIA TIME - INITIAL BASE CHARGE: Performed by: OTOLARYNGOLOGY

## 2024-09-17 PROCEDURE — 2500000005 HC RX 250 GENERAL PHARMACY W/O HCPCS: Performed by: ANESTHESIOLOGIST ASSISTANT

## 2024-09-17 PROCEDURE — 30465 REPAIR NASAL STENOSIS: CPT | Performed by: OTOLARYNGOLOGY

## 2024-09-17 PROCEDURE — 7100000009 HC PHASE TWO TIME - INITIAL BASE CHARGE: Performed by: OTOLARYNGOLOGY

## 2024-09-17 PROCEDURE — 7100000010 HC PHASE TWO TIME - EACH INCREMENTAL 1 MINUTE: Performed by: OTOLARYNGOLOGY

## 2024-09-17 PROCEDURE — 30802 ABLATE INF TURBINATE SUBMUC: CPT | Performed by: OTOLARYNGOLOGY

## 2024-09-17 PROCEDURE — A30930 PR THERAPUTIC FRACTURE INFER TURBINATE: Performed by: ANESTHESIOLOGIST ASSISTANT

## 2024-09-17 PROCEDURE — 2500000005 HC RX 250 GENERAL PHARMACY W/O HCPCS: Performed by: OTOLARYNGOLOGY

## 2024-09-17 PROCEDURE — 2720000007 HC OR 272 NO HCPCS: Performed by: OTOLARYNGOLOGY

## 2024-09-17 PROCEDURE — 20912 REMOVE CARTILAGE FOR GRAFT: CPT | Performed by: OTOLARYNGOLOGY

## 2024-09-17 PROCEDURE — 7100000002 HC RECOVERY ROOM TIME - EACH INCREMENTAL 1 MINUTE: Performed by: OTOLARYNGOLOGY

## 2024-09-17 PROCEDURE — 2500000001 HC RX 250 WO HCPCS SELF ADMINISTERED DRUGS (ALT 637 FOR MEDICARE OP): Performed by: ANESTHESIOLOGIST ASSISTANT

## 2024-09-17 RX ORDER — ONDANSETRON HYDROCHLORIDE 2 MG/ML
4 INJECTION, SOLUTION INTRAVENOUS ONCE AS NEEDED
Status: DISCONTINUED | OUTPATIENT
Start: 2024-09-17 | End: 2024-09-17 | Stop reason: HOSPADM

## 2024-09-17 RX ORDER — SODIUM CHLORIDE 0.65 %
2 AEROSOL, SPRAY (ML) NASAL
Qty: 44 ML | Refills: 3 | Status: SHIPPED | OUTPATIENT
Start: 2024-09-17

## 2024-09-17 RX ORDER — LIDOCAINE HYDROCHLORIDE 20 MG/ML
INJECTION, SOLUTION INFILTRATION; PERINEURAL AS NEEDED
Status: DISCONTINUED | OUTPATIENT
Start: 2024-09-17 | End: 2024-09-17

## 2024-09-17 RX ORDER — SODIUM CHLORIDE, SODIUM LACTATE, POTASSIUM CHLORIDE, CALCIUM CHLORIDE 600; 310; 30; 20 MG/100ML; MG/100ML; MG/100ML; MG/100ML
100 INJECTION, SOLUTION INTRAVENOUS CONTINUOUS
Status: DISCONTINUED | OUTPATIENT
Start: 2024-09-17 | End: 2024-09-17 | Stop reason: HOSPADM

## 2024-09-17 RX ORDER — MUPIROCIN 20 MG/G
OINTMENT TOPICAL AS NEEDED
Status: DISCONTINUED | OUTPATIENT
Start: 2024-09-17 | End: 2024-09-17 | Stop reason: HOSPADM

## 2024-09-17 RX ORDER — SODIUM CHLORIDE 9 MG/ML
INJECTION INTRAMUSCULAR; INTRAVENOUS; SUBCUTANEOUS AS NEEDED
Status: DISCONTINUED | OUTPATIENT
Start: 2024-09-17 | End: 2024-09-17 | Stop reason: HOSPADM

## 2024-09-17 RX ORDER — HYDROMORPHONE HYDROCHLORIDE 1 MG/ML
0.4 INJECTION, SOLUTION INTRAMUSCULAR; INTRAVENOUS; SUBCUTANEOUS EVERY 5 MIN PRN
Status: DISCONTINUED | OUTPATIENT
Start: 2024-09-17 | End: 2024-09-17 | Stop reason: HOSPADM

## 2024-09-17 RX ORDER — OXYCODONE HYDROCHLORIDE 5 MG/1
5 TABLET ORAL EVERY 6 HOURS PRN
Qty: 12 TABLET | Refills: 0 | Status: SHIPPED | OUTPATIENT
Start: 2024-09-17 | End: 2024-09-20

## 2024-09-17 RX ORDER — OXYMETAZOLINE HCL 0.05 %
SPRAY, NON-AEROSOL (ML) NASAL AS NEEDED
Status: DISCONTINUED | OUTPATIENT
Start: 2024-09-17 | End: 2024-09-17 | Stop reason: HOSPADM

## 2024-09-17 RX ORDER — ONDANSETRON HYDROCHLORIDE 2 MG/ML
INJECTION, SOLUTION INTRAVENOUS AS NEEDED
Status: DISCONTINUED | OUTPATIENT
Start: 2024-09-17 | End: 2024-09-17

## 2024-09-17 RX ORDER — SODIUM CHLORIDE, SODIUM LACTATE, POTASSIUM CHLORIDE, CALCIUM CHLORIDE 600; 310; 30; 20 MG/100ML; MG/100ML; MG/100ML; MG/100ML
INJECTION, SOLUTION INTRAVENOUS CONTINUOUS PRN
Status: DISCONTINUED | OUTPATIENT
Start: 2024-09-17 | End: 2024-09-17

## 2024-09-17 RX ORDER — CELECOXIB 200 MG/1
CAPSULE ORAL AS NEEDED
Status: DISCONTINUED | OUTPATIENT
Start: 2024-09-17 | End: 2024-09-17

## 2024-09-17 RX ORDER — LIDOCAINE IN NACL,ISO-OSMOT/PF 30 MG/3 ML
0.1 SYRINGE (ML) INJECTION ONCE
Status: DISCONTINUED | OUTPATIENT
Start: 2024-09-17 | End: 2024-09-17 | Stop reason: HOSPADM

## 2024-09-17 RX ORDER — SODIUM CHLORIDE 0.9 G/100ML
IRRIGANT IRRIGATION AS NEEDED
Status: DISCONTINUED | OUTPATIENT
Start: 2024-09-17 | End: 2024-09-17 | Stop reason: HOSPADM

## 2024-09-17 RX ORDER — EPINEPHRINE 1 MG/ML
INJECTION, SOLUTION, CONCENTRATE INTRAVENOUS AS NEEDED
Status: DISCONTINUED | OUTPATIENT
Start: 2024-09-17 | End: 2024-09-17 | Stop reason: HOSPADM

## 2024-09-17 RX ORDER — DOCUSATE SODIUM 100 MG/1
100 CAPSULE, LIQUID FILLED ORAL 2 TIMES DAILY PRN
Qty: 60 CAPSULE | Refills: 0 | Status: SHIPPED | OUTPATIENT
Start: 2024-09-17

## 2024-09-17 RX ORDER — OXYCODONE HYDROCHLORIDE 5 MG/1
5 TABLET ORAL EVERY 4 HOURS PRN
Status: DISCONTINUED | OUTPATIENT
Start: 2024-09-17 | End: 2024-09-17 | Stop reason: HOSPADM

## 2024-09-17 RX ORDER — MIDAZOLAM HYDROCHLORIDE 1 MG/ML
INJECTION, SOLUTION INTRAMUSCULAR; INTRAVENOUS AS NEEDED
Status: DISCONTINUED | OUTPATIENT
Start: 2024-09-17 | End: 2024-09-17

## 2024-09-17 RX ORDER — PROPOFOL 10 MG/ML
INJECTION, EMULSION INTRAVENOUS AS NEEDED
Status: DISCONTINUED | OUTPATIENT
Start: 2024-09-17 | End: 2024-09-17

## 2024-09-17 RX ORDER — CEFAZOLIN 1 G/1
INJECTION, POWDER, FOR SOLUTION INTRAVENOUS AS NEEDED
Status: DISCONTINUED | OUTPATIENT
Start: 2024-09-17 | End: 2024-09-17

## 2024-09-17 RX ORDER — ALBUTEROL SULFATE 0.83 MG/ML
2.5 SOLUTION RESPIRATORY (INHALATION) ONCE AS NEEDED
Status: DISCONTINUED | OUTPATIENT
Start: 2024-09-17 | End: 2024-09-17 | Stop reason: HOSPADM

## 2024-09-17 RX ORDER — GABAPENTIN 300 MG/1
CAPSULE ORAL AS NEEDED
Status: DISCONTINUED | OUTPATIENT
Start: 2024-09-17 | End: 2024-09-17

## 2024-09-17 RX ORDER — ACETAMINOPHEN 325 MG/1
TABLET ORAL AS NEEDED
Status: DISCONTINUED | OUTPATIENT
Start: 2024-09-17 | End: 2024-09-17

## 2024-09-17 RX ORDER — SUCCINYLCHOLINE CHLORIDE 100 MG/5ML
SYRINGE (ML) INTRAVENOUS AS NEEDED
Status: DISCONTINUED | OUTPATIENT
Start: 2024-09-17 | End: 2024-09-17

## 2024-09-17 RX ORDER — ONDANSETRON 4 MG/1
4 TABLET, FILM COATED ORAL EVERY 6 HOURS PRN
Qty: 10 TABLET | Refills: 0 | Status: SHIPPED | OUTPATIENT
Start: 2024-09-17 | End: 2024-09-24

## 2024-09-17 RX ORDER — SCOLOPAMINE TRANSDERMAL SYSTEM 1 MG/1
PATCH, EXTENDED RELEASE TRANSDERMAL AS NEEDED
Status: DISCONTINUED | OUTPATIENT
Start: 2024-09-17 | End: 2024-09-17

## 2024-09-17 RX ORDER — LIDOCAINE HYDROCHLORIDE AND EPINEPHRINE 10; 10 MG/ML; UG/ML
INJECTION, SOLUTION INFILTRATION; PERINEURAL AS NEEDED
Status: DISCONTINUED | OUTPATIENT
Start: 2024-09-17 | End: 2024-09-17 | Stop reason: HOSPADM

## 2024-09-17 RX ORDER — CEPHALEXIN 500 MG/1
500 CAPSULE ORAL 2 TIMES DAILY
Qty: 8 CAPSULE | Refills: 0 | Status: SHIPPED | OUTPATIENT
Start: 2024-09-17 | End: 2024-09-21

## 2024-09-17 RX ORDER — GLYCOPYRROLATE 0.2 MG/ML
INJECTION INTRAMUSCULAR; INTRAVENOUS AS NEEDED
Status: DISCONTINUED | OUTPATIENT
Start: 2024-09-17 | End: 2024-09-17

## 2024-09-17 RX ORDER — HYDROMORPHONE HYDROCHLORIDE 0.2 MG/ML
0.2 INJECTION INTRAMUSCULAR; INTRAVENOUS; SUBCUTANEOUS EVERY 5 MIN PRN
Status: DISCONTINUED | OUTPATIENT
Start: 2024-09-17 | End: 2024-09-17 | Stop reason: HOSPADM

## 2024-09-17 RX ORDER — FENTANYL CITRATE 50 UG/ML
INJECTION, SOLUTION INTRAMUSCULAR; INTRAVENOUS AS NEEDED
Status: DISCONTINUED | OUTPATIENT
Start: 2024-09-17 | End: 2024-09-17

## 2024-09-17 RX ORDER — TRANEXAMIC ACID 100 MG/ML
INJECTION, SOLUTION INTRAVENOUS AS NEEDED
Status: DISCONTINUED | OUTPATIENT
Start: 2024-09-17 | End: 2024-09-17 | Stop reason: HOSPADM

## 2024-09-17 SDOH — HEALTH STABILITY: MENTAL HEALTH: CURRENT SMOKER: 1

## 2024-09-17 ASSESSMENT — PAIN SCALES - GENERAL
PAINLEVEL_OUTOF10: 0 - NO PAIN

## 2024-09-17 ASSESSMENT — PAIN - FUNCTIONAL ASSESSMENT
PAIN_FUNCTIONAL_ASSESSMENT: 0-10

## 2024-09-17 ASSESSMENT — COLUMBIA-SUICIDE SEVERITY RATING SCALE - C-SSRS
2. HAVE YOU ACTUALLY HAD ANY THOUGHTS OF KILLING YOURSELF?: NO
6. HAVE YOU EVER DONE ANYTHING, STARTED TO DO ANYTHING, OR PREPARED TO DO ANYTHING TO END YOUR LIFE?: NO
1. IN THE PAST MONTH, HAVE YOU WISHED YOU WERE DEAD OR WISHED YOU COULD GO TO SLEEP AND NOT WAKE UP?: NO

## 2024-09-17 NOTE — DISCHARGE INSTRUCTIONS
[facility]AC[facility] PLASTIC SURGERY POSTOPERATIVE INSTRUCTIONS    NASAL SURGERY  Important Phone Numbers  Dr. German Fontanez: [phone]  Prema Flowers R.N.  Evenings/Weekends Emergency: [phone]  - please ask for the ENT resident on-call    At Home after Surgery:    Head Elevation: Keep your head elevated (the height of 2 pillows is appropriate) for 3 days to help with swelling.     Ice: Apply cold compresses to the cheeks and forehead, up to 20 minutes of each hour while awake after surgery, for the first 48 hours.  This will help reduce swelling and bruising.     Nasal Packing: If you have nasal packing in place with strings hanging out of your nose, you will remove it at home 24 or 48 hours after the operation (as directed by Dr. Fontanez) by pulling on the strings beneath the nose.  Please call the office if you are struggling to remove it.  If you have splints inside the nose that are sutured into place, they will be removed at your follow-up appointment.    Nasal Care: Use nasal saline spray, 4 sprays to each nostril every 2-3 hours while awake.  This will help keep the nasal passages moist and prevent scabbing in the nose.  It is normal to feel congested for several weeks after surgery.  Do not blow your nose for two weeks after surgery.      Incision/Cast Care: If you have an incision on the nose, apply antibiotic ointment four times a day.  The incision will heal most optimally if it is kept moist and clean.  You can use hydrogen peroxide on Q-tips to gently clean any crusts.  Do not rub but gently dab the incision to clean.   If you have a cast or dressing on the outside of your nose, this will remain in place until follow-up.  If the cast falls off, do not worry.  Tape it to your nose at nighttime while you sleep and if/when you wear glasses.      Shower: You may shower 24 hours after surgery.  Do not let the shower spray hit your face/nose directly and do not soak your face in water.  If you have a cast or  dressing on the outside of the nose, try to keep it as dry as possible.  Towel blot your nose/cast after your shower.    Bleeding: Most patients have mild, active bleeding the first night.  Some blood-tinged drainage is normal for 1-2 weeks after surgery.  Afrin nasal spray may be helpful for bleeding after surgery but should not be used for more than 3 days.  Excessive bleeding that does not stop is not expected; please call the office or seek medical attention if this occurs.    Medications: Take the medications as prescribed.  You can take Tylenol in addition to the narcotic pain medication prescribed.  Resume all home medications the night of surgery unless otherwise directed.  Avoid aspirin and NSAIDs for one week after surgery.     Activity: Resume normal activities of daily living, as you feel able.  However, avoid strenuous activity and heavy lifting (more than 10 lbs) for 3 weeks after surgery.  Light activity such as walking may be resumed after 1 week after surgery.  Sport activities may be resumed 1 month after surgery but try to protect your nose as it is still healing.    Seek Medical Attention: Call the office or seek medical attention if you develop fever greater than 101 degrees, excessive bleeding, excessive pain that is not well-controlled, skin rash, visual disturbances, or other unusual symptoms.     Follow-Up Care:  First Appointment: You will return one week after surgery for an appointment for suture and cast/dressing removal.  There are additional sutures inside your nose that will dissolve on their own.    Postoperative Healing: Your nose will be swollen and will remain so for several weeks.  It is important to keep in mind that although much of the swelling resolves over the first several weeks after surgery, it takes 12 to 15 months for all of the swelling in the nose to resolve.  However, most patients have a good appearance even 2-3 weeks after the operation.      Additional  Appointments: Ideally, we would like to see you back between within 1-2 weeks after surgery. Subsequently, our follow up will be approximately 4-6 weeks, then 4-6 months after surgery to examine the healing. After this, the follow-up is quite variable, and depends on how you are doing and feeling. Often, this means visits at about 12 months after surgery to follow your healing process.  Please call the office at any time if you have any questions or concerns and would like to be seen sooner than your next scheduled visit.  TO REACH YOUR PHYSICIAN AFTER HOURS CALL  AND ASK FOR THE PHYSICIAN ON CALL  May have Tylenol after: 3:45 pm    May have Ibuprofen/advil/motrin/aleve after: 3:45 pm 9/18/2024  Scopalamine patch may stay on for 72 hrs.  Remove patch, discard away form pets, children.  Do not touch eyes!  Wash hands thoroughly

## 2024-09-17 NOTE — ANESTHESIA PROCEDURE NOTES
Airway  Date/Time: 9/17/2024 10:20 AM  Urgency: elective    Airway not difficult    Staffing  Performed: KM   Authorized by: Saw Browning MD    Performed by: KM Smith  Patient location during procedure: OR    Indications and Patient Condition  Indications for airway management: anesthesia  Spontaneous Ventilation: absent  Sedation level: deep  Preoxygenated: yes  Patient position: sniffing  MILS maintained throughout  Mask difficulty assessment: 1 - vent by mask  Planned trial extubation    Final Airway Details  Final airway type: endotracheal airway      Successful airway: LEOBARDO tube and ETT  Cuffed: yes   Successful intubation technique: direct laryngoscopy  Blade: Carlyn  Blade size: #3  ETT size (mm): 7.0  Cormack-Lehane Classification: grade I - full view of glottis  Measured from: lips  ETT to lips (cm): 21  Number of attempts at approach: 1  Number of other approaches attempted: 0    Additional Comments  Lips/teeth in pre-anesthetic condition.

## 2024-09-17 NOTE — OP NOTE
septoplasty, septal graft, nasal valve repair, inferior turbinate reduction (B) Operative Note     Date: 2024  OR Location: [facility] OR    Name: Cynthia Brooks, : 2000, Age: 24 y.o., MRN: [id number], Sex: female    Diagnosis  Pre-op Diagnosis      * Nasal congestion [R09.81]     * Deviated nasal septum [J34.2]     * Hypertrophy of nasal turbinates [J34.3]     * Acquired deformity of nose [M95.0] Post-op Diagnosis     * Nasal congestion [R09.81]     * Deviated nasal septum [J34.2]     * Hypertrophy of nasal turbinates [J34.3]     * Acquired deformity of nose [M95.0]     Procedures  septoplasty, septal graft, nasal valve repair, inferior turbinate reduction  65869 - [place] SEPTOPLASTY/SUBMUCOUS RESECJ W/WO CARTILAGE GRF  51600 - [place] CARTILAGE GRAFT NASAL SEPTUM  33522 - [place] REPAIR NASAL VESTIBULAR STENOSIS   28752 - [place] FRACTURE NASAL INFERIOR TURBINATE THERAPEUTIC  93422 - [place] ABLTJ SOF TISS INF TURBS UNI/BI SUPFC INTRAMURAL    Surgeons      * German Fontanez - Primary    Resident/Fellow/Other Assistant:  Surgeons and Role:  * No surgeons found with a matching role *    Procedure Summary  Anesthesia: General  ASA: II  Anesthesia Staff: Anesthesiologist: Saw Browning MD  C-AA: KM Smith  Estimated Blood Loss: 10 mL  Intra-op Medications:   Administrations occurring from 0945 to 1200 on 24:   Medication Name Total Dose   oxymetazoline (Afrin) 0.05 % nasal spray 3 spray   lidocaine-epinephrine (Xylocaine W/EPI) 1 %-1:100,000 injection 20 mL   tranexamic acid (Cyklokapron) injection 1.2 g   balanced salts (BSS) intraocular solution 10 mL   EPINEPHrine HCl (PF) (Adrenalin) injection 2 mg   mupirocin (Bactroban) 2 % ointment 1 Application   sodium chloride 0.9 % irrigation solution 500 mL   sodium chloride bacteriostatic 0.9 % injection 20 mL              Anesthesia Record               Intraprocedure I/O Totals          Intake    LR infusion 200.00 mL    Total Intake 200 mL       Output     Est. Blood Loss 10 mL    Total Output 10 mL       Net    Net Volume 190 mL          Specimen: No specimens collected     Staff:   Circulator: Ann  Scrub Person: Radhika Smith Scrub: Crista         Drains and/or Catheters: * None in log *    Tourniquet Times:         Implants:     Findings: see op note    Indications: Cynthia Brooks is an 24 y.o. female who is having surgery for Nasal congestion [R09.81]  Deviated nasal septum [J34.2]  Hypertrophy of nasal turbinates [J34.3]  Acquired deformity of nose [M95.0].     The patient was seen in the preoperative area. The risks, benefits, complications, treatment options, non-operative alternatives, expected recovery and outcomes were discussed with the patient. The possibilities of reaction to medication, pulmonary aspiration, injury to surrounding structures, bleeding, recurrent infection, the need for additional procedures, failure to diagnose a condition, and creating a complication requiring transfusion or operation were discussed with the patient. The patient concurred with the proposed plan, giving informed consent.  The site of surgery was properly noted/marked if necessary per policy. The patient has been actively warmed in preoperative area. Preoperative antibiotics have been ordered and given within 1 hours of incision. Venous thrombosis prophylaxis have been ordered including bilateral sequential compression devices    Procedure Details:     The patient was brought to the operating room and placed in the supine position, then intubated under general anesthesia.  The nose was injected with 1% lidocaine with epinephrine and then packed with decongestant-soaked pledgets.  The face was prepped and draped in the usual sterile fashion.  A left hemitransfixion incision was performed and mucoperichondrial flaps were elevated on the left and subsequently the right after crossing over the cartilage.  The deviated septum was treated by removing deformed quadrangular  cartilage and bony septum. Septal cartilage and bone was harvested, taking care to preserve a >1.0 cm L-shaped strut.     A dorsal strut grafts were then created using previously harvested septal bone, and suture fixed with 5-0 PDS in between the upper lateral cartilage and septum on the right, in order to treat the internal nasal valve, stabilize the midvault, and control/optimize the midvault width.      Bilateral inferior turbinate reduction and lateralization was then performed.  A [place] fine needle tip bovie electrocautery was used to perform intramural cauterization for submucous resection bilaterally.  A [place] elevator was then used to outfracture the inferior turbinate bilaterally.     We then turned our attention to closure.  Meticulous closure was performed using gut suture in a simple, interrupted fashion.  The nose was gently cleansed with sterile saline and an external nasal splint was placed in the usual fashion.  This concluded the goals of the procedure.  The patient was turned over to Anesthesia, extubated, and transferred to the PACU.    Complications:  None; patient tolerated the procedure well.    Disposition: PACU - hemodynamically stable.  Condition: stable       Additional Details: None    Attending Attestation: I was present and scrubbed for the entire procedure.    German Fontanez  Phone Number: [phone]

## 2024-09-17 NOTE — ANESTHESIA POSTPROCEDURE EVALUATION
Patient: Cynthia Brooks    Procedure Summary       Date: 09/17/24 Room / Location: [facility] OR 03 / [facility]    Anesthesia Start: 1012 Anesthesia Stop: 1153    Procedure: septoplasty, septal graft, nasal valve repair, inferior turbinate reduction (Bilateral) Diagnosis:       Nasal congestion      Deviated nasal septum      Hypertrophy of nasal turbinates      Acquired deformity of nose      (Nasal congestion [R09.81])      (Deviated nasal septum [J34.2])      (Hypertrophy of nasal turbinates [J34.3])      (Acquired deformity of nose [M95.0])    Surgeons: German Fontanez MD Responsible Provider: Saw Browning MD    Anesthesia Type: general ASA Status: 2            Anesthesia Type: general    Vitals Value Taken Time   /86 09/17/24 1220   Temp 36 °C (96.8 °F) 09/17/24 1220   Pulse 69 09/17/24 1220   Resp 16 09/17/24 1220   SpO2 98 % 09/17/24 1220       Anesthesia Post Evaluation    Patient location during evaluation: PACU  Patient participation: complete - patient participated  Level of consciousness: awake  Pain management: satisfactory to patient  Multimodal analgesia pain management approach  Airway patency: patent  Cardiovascular status: acceptable  Respiratory status: acceptable  Hydration status: stable  Postoperative Nausea and Vomiting: none  Comments: Did well    There were no known notable events for this encounter.

## 2024-09-17 NOTE — ANESTHESIA PREPROCEDURE EVALUATION
Patient: Cynthia Brooks    Procedure Information       Anesthesia Start Date/Time: 09/17/24 1012    Procedure: septoplasty, septal graft, nasal valve repair, inferior turbinate reduction (Bilateral)    Location: [facility] OR 03 / [facility] OR    Surgeons: German Fontanez MD            Relevant Problems   Pulmonary   (+) Exercise-induced asthma (HHS-HCC)      Neuro   (+) Reactive depression      ID   (+) Infection       Clinical information reviewed:   Tobacco  Allergies  Meds   Med Hx  Surg Hx   Fam Hx  Soc Hx        NPO Detail:  NPO/Void Status  Date of Last Liquid: 09/17/24  Time of Last Liquid: 0800 (sip of water with morning med)  Date of Last Solid: 09/16/24  Time of Last Solid: 2200         Physical Exam    Airway  Mallampati: II  TM distance: >3 FB  Neck ROM: full     Cardiovascular - normal exam  Rhythm: regular  Rate: normal     Dental - normal exam     Pulmonary - normal exam  Breath sounds clear to auscultation     Abdominal        Anesthesia Plan    History of general anesthesia?: yes  History of complications of general anesthesia?: no    ASA 2     general   (PONV with wisdom teeth)  The patient is a current smoker.  Patient was previously instructed to abstain from smoking on day of procedure.  Patient did not smoke on day of procedure.    intravenous induction   Postoperative administration of opioids is intended.  Anesthetic plan and risks discussed with patient and mother.    Plan discussed with CAA.

## 2024-09-17 NOTE — H&P
"History Of Present Illness  Cynthia Brooks is a 24 y.o. female presenting with nasal obstruction, L>R.     Past Medical History  Past Medical History:   Diagnosis Date    Anxiety and depression     Bipolar 2 disorder (Multi)     Headache     Numbness and tingling of both legs        Surgical History  Past Surgical History:   Procedure Laterality Date    MR HEAD ANGIO WO IV CONTRAST  12/05/2019    MR HEAD ANGIO WO IV CONTRAST 12/5/2019 [facility]    WISDOM TOOTH EXTRACTION          Social History  She reports that she has never smoked. She has never been exposed to tobacco smoke. She has never used smokeless tobacco. She reports current alcohol use of about 2.0 standard drinks of alcohol per week. She reports that she does not currently use drugs after having used the following drugs: Marijuana.    Family History  Family History   Problem Relation Name Age of Onset    No Known Problems Mother      No Known Problems Father      No Known Problems Sister      Heart disease Maternal Grandfather      Heart attack Maternal Grandfather  60    Hypertension Paternal Grandmother      Diabetes Paternal Grandmother      Skin cancer Paternal Grandmother      Cancer Paternal Grandfather      Hypertension Paternal Grandfather      Diabetes Paternal Grandfather      Other (renal tumor) Paternal Grandfather          Allergies  Patient has no known allergies.    Review of Systems     Physical Exam  General: no acute distress  Card: acyanotic  Pulm: no increased WOB on RA     Last Recorded Vitals  Blood pressure 119/65, pulse 59, temperature 36.3 °C (97.3 °F), temperature source Temporal, resp. rate 16, height 1.626 m (5' 4\"), weight 67.4 kg (148 lb 9.4 oz), SpO2 99%.     Assessment/Plan   Assessment & Plan  Nasal congestion    Deviated nasal septum    Hypertrophy of nasal turbinates    Acquired deformity of nose      Plan: septoplasty, inferior turb reduction, repair of nasal valves        Brendan Bae MD    "

## 2024-09-17 NOTE — ADDENDUM NOTE
Addendum  created 09/17/24 1253 by Nia Gusman, CAA    Intraprocedure Meds edited, Orders acknowledged in Narrator

## 2024-09-18 ENCOUNTER — PHARMACY VISIT (OUTPATIENT)
Dept: PHARMACY | Facility: CLINIC | Age: 24
End: 2024-09-18
Payer: COMMERCIAL

## 2024-09-18 ENCOUNTER — HOSPITAL ENCOUNTER (EMERGENCY)
Facility: HOSPITAL | Age: 24
Discharge: HOME | End: 2024-09-18
Payer: COMMERCIAL

## 2024-09-18 ENCOUNTER — TELEPHONE (OUTPATIENT)
Dept: OTOLARYNGOLOGY | Facility: CLINIC | Age: 24
End: 2024-09-18
Payer: COMMERCIAL

## 2024-09-18 ENCOUNTER — APPOINTMENT (OUTPATIENT)
Dept: RADIOLOGY | Facility: HOSPITAL | Age: 24
End: 2024-09-18
Payer: COMMERCIAL

## 2024-09-18 ENCOUNTER — APPOINTMENT (OUTPATIENT)
Dept: CARDIOLOGY | Facility: HOSPITAL | Age: 24
End: 2024-09-18
Payer: COMMERCIAL

## 2024-09-18 VITALS
TEMPERATURE: 98.5 F | WEIGHT: 146.83 LBS | BODY MASS INDEX: 24.46 KG/M2 | SYSTOLIC BLOOD PRESSURE: 113 MMHG | OXYGEN SATURATION: 97 % | HEART RATE: 72 BPM | HEIGHT: 65 IN | RESPIRATION RATE: 18 BRPM | DIASTOLIC BLOOD PRESSURE: 75 MMHG

## 2024-09-18 DIAGNOSIS — M54.6 ACUTE BILATERAL THORACIC BACK PAIN: Primary | ICD-10-CM

## 2024-09-18 LAB
ALBUMIN SERPL BCP-MCNC: 4 G/DL (ref 3.4–5)
ALP SERPL-CCNC: 48 U/L (ref 33–110)
ALT SERPL W P-5'-P-CCNC: 9 U/L (ref 7–45)
ANION GAP SERPL CALCULATED.3IONS-SCNC: 8 MMOL/L (ref 10–20)
AST SERPL W P-5'-P-CCNC: 16 U/L (ref 9–39)
BASOPHILS # BLD AUTO: 0.02 X10*3/UL (ref 0–0.1)
BASOPHILS NFR BLD AUTO: 0.2 %
BILIRUB SERPL-MCNC: 0.9 MG/DL (ref 0–1.2)
BUN SERPL-MCNC: 11 MG/DL (ref 6–23)
CALCIUM SERPL-MCNC: 9 MG/DL (ref 8.6–10.3)
CARDIAC TROPONIN I PNL SERPL HS: 4 NG/L (ref 0–13)
CARDIAC TROPONIN I PNL SERPL HS: 4 NG/L (ref 0–13)
CHLORIDE SERPL-SCNC: 102 MMOL/L (ref 98–107)
CO2 SERPL-SCNC: 30 MMOL/L (ref 21–32)
CREAT SERPL-MCNC: 0.85 MG/DL (ref 0.5–1.05)
EGFRCR SERPLBLD CKD-EPI 2021: >90 ML/MIN/1.73M*2
EOSINOPHIL # BLD AUTO: 0.07 X10*3/UL (ref 0–0.7)
EOSINOPHIL NFR BLD AUTO: 0.7 %
ERYTHROCYTE [DISTWIDTH] IN BLOOD BY AUTOMATED COUNT: 12.3 % (ref 11.5–14.5)
GLUCOSE SERPL-MCNC: 89 MG/DL (ref 74–99)
HCT VFR BLD AUTO: 38.1 % (ref 36–46)
HGB BLD-MCNC: 12.6 G/DL (ref 12–16)
IMM GRANULOCYTES # BLD AUTO: 0.03 X10*3/UL (ref 0–0.7)
IMM GRANULOCYTES NFR BLD AUTO: 0.3 % (ref 0–0.9)
LYMPHOCYTES # BLD AUTO: 1.33 X10*3/UL (ref 1.2–4.8)
LYMPHOCYTES NFR BLD AUTO: 13.4 %
MCH RBC QN AUTO: 28 PG (ref 26–34)
MCHC RBC AUTO-ENTMCNC: 33.1 G/DL (ref 32–36)
MCV RBC AUTO: 85 FL (ref 80–100)
MONOCYTES # BLD AUTO: 0.81 X10*3/UL (ref 0.1–1)
MONOCYTES NFR BLD AUTO: 8.2 %
NEUTROPHILS # BLD AUTO: 7.67 X10*3/UL (ref 1.2–7.7)
NEUTROPHILS NFR BLD AUTO: 77.2 %
NRBC BLD-RTO: 0 /100 WBCS (ref 0–0)
PLATELET # BLD AUTO: 228 X10*3/UL (ref 150–450)
POTASSIUM SERPL-SCNC: 3.2 MMOL/L (ref 3.5–5.3)
PROT SERPL-MCNC: 7 G/DL (ref 6.4–8.2)
RBC # BLD AUTO: 4.5 X10*6/UL (ref 4–5.2)
SODIUM SERPL-SCNC: 137 MMOL/L (ref 136–145)
WBC # BLD AUTO: 9.9 X10*3/UL (ref 4.4–11.3)

## 2024-09-18 PROCEDURE — 2500000001 HC RX 250 WO HCPCS SELF ADMINISTERED DRUGS (ALT 637 FOR MEDICARE OP): Performed by: NURSE PRACTITIONER

## 2024-09-18 PROCEDURE — 80053 COMPREHEN METABOLIC PANEL: CPT | Performed by: NURSE PRACTITIONER

## 2024-09-18 PROCEDURE — 36415 COLL VENOUS BLD VENIPUNCTURE: CPT | Performed by: NURSE PRACTITIONER

## 2024-09-18 PROCEDURE — 84484 ASSAY OF TROPONIN QUANT: CPT | Performed by: NURSE PRACTITIONER

## 2024-09-18 PROCEDURE — RXMED WILLOW AMBULATORY MEDICATION CHARGE

## 2024-09-18 PROCEDURE — 85025 COMPLETE CBC W/AUTO DIFF WBC: CPT | Performed by: NURSE PRACTITIONER

## 2024-09-18 PROCEDURE — 71275 CT ANGIOGRAPHY CHEST: CPT | Performed by: STUDENT IN AN ORGANIZED HEALTH CARE EDUCATION/TRAINING PROGRAM

## 2024-09-18 PROCEDURE — 99285 EMERGENCY DEPT VISIT HI MDM: CPT | Mod: 25

## 2024-09-18 PROCEDURE — 93005 ELECTROCARDIOGRAM TRACING: CPT

## 2024-09-18 PROCEDURE — 2500000004 HC RX 250 GENERAL PHARMACY W/ HCPCS (ALT 636 FOR OP/ED): Performed by: NURSE PRACTITIONER

## 2024-09-18 PROCEDURE — 2550000001 HC RX 255 CONTRASTS: Performed by: NURSE PRACTITIONER

## 2024-09-18 PROCEDURE — 96374 THER/PROPH/DIAG INJ IV PUSH: CPT | Mod: 59

## 2024-09-18 PROCEDURE — 71275 CT ANGIOGRAPHY CHEST: CPT

## 2024-09-18 RX ORDER — KETOROLAC TROMETHAMINE 30 MG/ML
15 INJECTION, SOLUTION INTRAMUSCULAR; INTRAVENOUS ONCE
Status: COMPLETED | OUTPATIENT
Start: 2024-09-18 | End: 2024-09-18

## 2024-09-18 RX ORDER — METHOCARBAMOL 500 MG/1
500 TABLET, FILM COATED ORAL ONCE
Status: COMPLETED | OUTPATIENT
Start: 2024-09-18 | End: 2024-09-18

## 2024-09-18 RX ORDER — METHOCARBAMOL 500 MG/1
500 TABLET, FILM COATED ORAL 3 TIMES DAILY
Qty: 21 TABLET | Refills: 0 | Status: SHIPPED | OUTPATIENT
Start: 2024-09-18 | End: 2024-09-25

## 2024-09-18 RX ORDER — DOXYCYCLINE 100 MG/1
100 TABLET ORAL 2 TIMES DAILY
Qty: 14 TABLET | Refills: 0 | Status: SHIPPED | OUTPATIENT
Start: 2024-09-18 | End: 2024-09-25

## 2024-09-18 ASSESSMENT — PAIN DESCRIPTION - ONSET: ONSET: SUDDEN

## 2024-09-18 ASSESSMENT — PAIN DESCRIPTION - LOCATION: LOCATION: BACK

## 2024-09-18 ASSESSMENT — COLUMBIA-SUICIDE SEVERITY RATING SCALE - C-SSRS
6. HAVE YOU EVER DONE ANYTHING, STARTED TO DO ANYTHING, OR PREPARED TO DO ANYTHING TO END YOUR LIFE?: NO
1. IN THE PAST MONTH, HAVE YOU WISHED YOU WERE DEAD OR WISHED YOU COULD GO TO SLEEP AND NOT WAKE UP?: NO
2. HAVE YOU ACTUALLY HAD ANY THOUGHTS OF KILLING YOURSELF?: NO

## 2024-09-18 ASSESSMENT — PAIN SCALES - GENERAL
PAINLEVEL_OUTOF10: 8
PAINLEVEL_OUTOF10: 3

## 2024-09-18 ASSESSMENT — PAIN DESCRIPTION - FREQUENCY: FREQUENCY: CONSTANT/CONTINUOUS

## 2024-09-18 ASSESSMENT — PAIN - FUNCTIONAL ASSESSMENT
PAIN_FUNCTIONAL_ASSESSMENT: 0-10
PAIN_FUNCTIONAL_ASSESSMENT: 0-10

## 2024-09-18 ASSESSMENT — PAIN DESCRIPTION - PROGRESSION: CLINICAL_PROGRESSION: NOT CHANGED

## 2024-09-18 ASSESSMENT — PAIN DESCRIPTION - DESCRIPTORS: DESCRIPTORS: STABBING

## 2024-09-18 ASSESSMENT — PAIN DESCRIPTION - ORIENTATION: ORIENTATION: MID

## 2024-09-18 ASSESSMENT — PAIN DESCRIPTION - PAIN TYPE: TYPE: ACUTE PAIN

## 2024-09-18 NOTE — ED PROVIDER NOTES
HPI   Chief Complaint   Patient presents with    Back Pain     Pt had surgery for a deviated septum yesterday. Pt is complaining of back and ribcage pain.       HPI  See my MDM      Patient History   Past Medical History:   Diagnosis Date    Anxiety and depression     Bipolar 2 disorder (Multi)     Headache     Numbness and tingling of both legs      Past Surgical History:   Procedure Laterality Date    MR HEAD ANGIO WO IV CONTRAST  12/05/2019    MR HEAD ANGIO WO IV CONTRAST 12/5/2019 [facility]    WISDOM TOOTH EXTRACTION       Family History   Problem Relation Name Age of Onset    No Known Problems Mother      No Known Problems Father      No Known Problems Sister      Heart disease Maternal Grandfather      Heart attack Maternal Grandfather  60    Hypertension Paternal Grandmother      Diabetes Paternal Grandmother      Skin cancer Paternal Grandmother      Cancer Paternal Grandfather      Hypertension Paternal Grandfather      Diabetes Paternal Grandfather      Other (renal tumor) Paternal Grandfather       Social History     Tobacco Use    Smoking status: Never     Passive exposure: Never    Smokeless tobacco: Never   Vaping Use    Vaping status: Former    Substances: Nicotine, Flavoring    Devices: Disposable   Substance Use Topics    Alcohol use: Yes     Alcohol/week: 2.0 standard drinks of alcohol     Types: 2 Glasses of wine per week    Drug use: Not Currently     Types: Marijuana       Physical Exam   ED Triage Vitals [09/18/24 1215]   Temperature Heart Rate Respirations BP   36.9 °C (98.5 °F) 72 18 113/75      Pulse Ox Temp Source Heart Rate Source Patient Position   97 % Temporal Monitor Sitting      BP Location FiO2 (%)     Left arm --       Physical Exam  CONSTITUTIONAL: Vital signs reviewed as charted, well-developed and in no distress  Eyes: Extraocular muscles are intact. Pupils equal round and reactive to light. Conjunctiva are pink.    ENT: Mucous membranes are moist. Tongue in the  midline. Pharynx was without erythema or exudates, uvula midline  LUNGS: Breath sounds equal and clear to auscultation. Good air exchange, no wheezes rales or retractions, pulse oximetry is charted.  HEART: Regular rate and rhythm without murmur thrill or rub, strong tones, auscultation is normal.  ABDOMEN: Soft and nontender without guarding rebound rigidity or mass. Bowel sounds are present and normal in all quadrants. There is no palpable masses or aneurysms identified. No hepatosplenomegaly, normal abdominal exam.  Neuro: The patient is awake, alert and oriented ×3. Moving all 4 extremities and answering questions appropriately.   MUSCULOSKELETAL: No midline tenderness deformities or crepitus noted palpation cervical thoracic or lumbar spine.  Does have tenderness on both sides of the spine in the thoracic area into the rhomboids.  PSYCH: Awake alert oriented, normal mood and affect.  Skin:  Dry, normal color, warm to the touch, no rash present.        ED Course & MDM   ED Course as of 09/18/24 1429   Wed Sep 18, 2024   1302 EKG on my independent interpretation: Sinus rhythm 61 bpm with short MO otherwise normal intervals, normal axis, no acute ST or T wave abnormalities [ENDY]      ED Course User Index  [ENDY] Vicky Bruno MD         Diagnoses as of 09/18/24 1429   Acute bilateral thoracic back pain                 No data recorded     [place] Coma Scale Score: 15 (09/18/24 1221 : Sulma Stoddard RN)                           Medical Decision Making  History obtained from: patient    Vital signs, nursing notes, current medications, past medical history, Surgical history, allergies, social history, family History were reviewed.         HPI:  Patient 24-year-old female present emergency room today stating she had surgery for deviated septum yesterday and today she is developed pain in her mid back and ribs worse with breathing.  No fevers chills or night sweats.  No nausea vomiting diarrhea.  Nontoxic  "well-appearing.  Patient states she talked to her surgeon and told to go to emergency room    10 point ROS was reviewed and negative except Noted above in HPI.  DDX: as listed above          MDM Summary/considerations:  EMERGENCY DEPARTMENT COURSE and DIFFERENTIAL DIAGNOSIS/MDM:    The patient presented with a chief complaint of mid back pain, shortness of breath. The differential diagnosis associated with this patient's presentation includes pulmonary embolism, CAD, costochondritis.     Vitals:    Vitals:    09/18/24 1215   BP: 113/75   BP Location: Left arm   Patient Position: Sitting   Pulse: 72   Resp: 18   Temp: 36.9 °C (98.5 °F)   TempSrc: Temporal   SpO2: 97%   Weight: 66.6 kg (146 lb 13.2 oz)   Height: 1.651 m (5' 5\")       ED Course as of 09/18/24 1429   Wed Sep 18, 2024   1302 EKG on my independent interpretation: Sinus rhythm 61 bpm with short LA otherwise normal intervals, normal axis, no acute ST or T wave abnormalities [ENDY]      ED Course User Index  [ENDY] Vicky Bruno MD         Diagnoses as of 09/18/24 1429   Acute bilateral thoracic back pain       History Limited by:    None    Independent history obtained from:    None    External records reviewed:    Specialist note from yesterday surgery    Diagnostics interpreted by me:    EKG interpreted by my attending physician and CT Scan(s) of the chest    Discussions with other clinicians:    None    Chronic conditions impacting care:    None    Social determinants of health affecting care:    None    Diagnostic tests considered but not performed: none    ED Medications managed:    Medications   ketorolac (Toradol) injection 15 mg (15 mg intravenous Given 9/18/24 1250)   methocarbamol (Robaxin) tablet 500 mg (500 mg oral Given 9/18/24 1250)   iohexol (OMNIPaque) 350 mg iodine/mL solution 75 mL (75 mL intravenous Given 9/18/24 1322)       Prescription drugs considered:    Antibiotics doxycycline, Robaxin    Screenings:          Labs Reviewed "   COMPREHENSIVE METABOLIC PANEL - Abnormal       Result Value    Glucose 89      Sodium 137      Potassium 3.2 (*)     Chloride 102      Bicarbonate 30      Anion Gap 8 (*)     Urea Nitrogen 11      Creatinine 0.85      eGFR >90      Calcium 9.0      Albumin 4.0      Alkaline Phosphatase 48      Total Protein 7.0      AST 16      Bilirubin, Total 0.9      ALT 9     SERIAL TROPONIN-INITIAL - Normal    Troponin I, High Sensitivity 4      Narrative:     Less than 99th percentile of normal range cutoff-  Female and children under 18 years old <14 ng/L; Male <21 ng/L: Negative  Repeat testing should be performed if clinically indicated.     Female and children under 18 years old 14-50 ng/L; Male 21-50 ng/L:  Consistent with possible cardiac damage and possible increased clinical   risk. Serial measurements may help to assess extent of myocardial damage.     >50 ng/L: Consistent with cardiac damage, increased clinical risk and  myocardial infarction. Serial measurements may help assess extent of   myocardial damage.      NOTE: Children less than 1 year old may have higher baseline troponin   levels and results should be interpreted in conjunction with the overall   clinical context.     NOTE: Troponin I testing is performed using a different   testing methodology at [facility] than at [facility]. Direct result comparisons should only   be made within the same method.   SERIAL TROPONIN, 1 HOUR - Normal    Troponin I, High Sensitivity 4      Narrative:     Less than 99th percentile of normal range cutoff-  Female and children under 18 years old <14 ng/L; Male <21 ng/L: Negative  Repeat testing should be performed if clinically indicated.     Female and children under 18 years old 14-50 ng/L; Male 21-50 ng/L:  Consistent with possible cardiac damage and possible increased clinical   risk. Serial measurements may help to assess extent of myocardial damage.     >50 ng/L: Consistent with cardiac  damage, increased clinical risk and  myocardial infarction. Serial measurements may help assess extent of   myocardial damage.      NOTE: Children less than 1 year old may have higher baseline troponin   levels and results should be interpreted in conjunction with the overall   clinical context.     NOTE: Troponin I testing is performed using a different   testing methodology at [facility] than at other   [facility]. Direct result comparisons should only   be made within the same method.   CBC WITH AUTO DIFFERENTIAL    WBC 9.9      nRBC 0.0      RBC 4.50      Hemoglobin 12.6      Hematocrit 38.1      MCV 85      MCH 28.0      MCHC 33.1      RDW 12.3      Platelets 228      Neutrophils % 77.2      Immature Granulocytes %, Automated 0.3      Lymphocytes % 13.4      Monocytes % 8.2      Eosinophils % 0.7      Basophils % 0.2      Neutrophils Absolute 7.67      Immature Granulocytes Absolute, Automated 0.03      Lymphocytes Absolute 1.33      Monocytes Absolute 0.81      Eosinophils Absolute 0.07      Basophils Absolute 0.02     TROPONIN SERIES- (INITIAL, 1 HR)    Narrative:     The following orders were created for panel order Troponin Series, (0, 1 HR).  Procedure                               Abnormality         Status                     ---------                               -----------         ------                     Troponin I, High Sensiti...[[id number]]  Normal              Final result               Troponin, High Sensitivi...[[id number]]  Normal              Final result                 Please view results for these tests on the individual orders.     CT angio chest for pulmonary embolism   Final Result   No PE.        Patchy right lower lobe ground-glass opacities could represent edema   or early pneumonia in the appropriate clinical context. Consider   follow-up to resolution in 4-6 weeks.        Nonspecific 4 mm right lower lobe ground-glass nodule. Attention on   above suggested  follow-up exam.        MACRO   None        Signed by: Mohsen Santiago 9/18/2024 1:50 PM   Dictation workstation:   [id number]        Medications   ketorolac (Toradol) injection 15 mg (15 mg intravenous Given 9/18/24 1250)   methocarbamol (Robaxin) tablet 500 mg (500 mg oral Given 9/18/24 1250)   iohexol (OMNIPaque) 350 mg iodine/mL solution 75 mL (75 mL intravenous Given 9/18/24 1322)     New Prescriptions    DOXYCYCLINE (ADOXA) 100 MG TABLET    Take 1 tablet (100 mg) by mouth 2 times a day for 7 days. Take with a full glass of water and do not lie down for at least 30 minutes after    METHOCARBAMOL (ROBAXIN) 500 MG TABLET    Take 1 tablet (500 mg) by mouth 3 times a day for 7 days. As needed for pain       I estimate there is a low risk for pericardial tamponade, pneumothorax, pulmonary embolism, acute coronary syndrome, or thoracic aortic dissection, thus I considered the discharge disposition reasonable. We have discussed the diagnosis and risks, and we agree with discharging home to follow up with there cardiologist. We also discussed returning to the emergency department immediately if new or worsening symptoms occur. We have discussed the symptoms which are most concerning such as bloody sputum, fever, worsening pain or shortness of breath, or vomiting necessitates immediate return.    CT angio of the chest shows no evidence of acute embolism.  There is small amount of atelectasis or early pneumonia in the right lower lobe.  Will start antibiotics for this.  Rest of the workup is grossly unremarkable.  Patient is feeling much better after medications here in the ED symptoms nearly resolved.  Was discharged home stable condition.    All of the patient's questions were answered to the best of my ability.  Patient states understanding that they have been screened for an emergency today and we have not found any etiology of symptoms that requires emergent treatment or admission to the hospital at this point. They  understand that they have not had definitive care day and require follow-up for treatment of their condition. They also state understanding that they may have an emergent condition that may potentially have not of detected at this visit and they must return to the emergency department if they develop any worsening of symptoms or new complaints.      Discussed H&P with supervising physician, aware of results and agrees with plan/ disposition.        Critical Care: Not warranted at this time        This chart was completed using voice recognition transcription software. Please excuse any errors of transcription including grammatical, punctuation, syntax and spelling errors.  Please contact me with any questions regarding this chart.    Procedure  Procedures     PORFIRIO Titus-SHOSHANA  09/18/24 1446

## 2024-09-18 NOTE — TELEPHONE ENCOUNTER
Called Cynthia to check on the status of his recovery. Patient is POD 1 Septoplasty, turbinate reduction, and nasal valve repair performed by Dr. Fontanez at [facility] on 9/17/2024. Patient reported she is having chest pain/pressure, back pain and pain in her rib cage. Patient instructed that those symptoms are not normal after nasal surgery. She should be evaluated in the emergency room to rule out a cardiac event. Dr. Fontanez made aware and agreeable with this plan of care. Patient verbalized understanding and reminded to call office with any further questions.  Patient encouraged to continue saline sprays and ointment for nose, and made aware that numbness to roof of mouth is expected and will get better with time.

## 2024-09-19 LAB
ATRIAL RATE: 61 BPM
P AXIS: 14 DEGREES
P OFFSET: 209 MS
P ONSET: 165 MS
PR INTERVAL: 100 MS
Q ONSET: 215 MS
QRS COUNT: 10 BEATS
QRS DURATION: 102 MS
QT INTERVAL: 438 MS
QTC CALCULATION(BAZETT): 440 MS
QTC FREDERICIA: 440 MS
R AXIS: 64 DEGREES
T AXIS: 50 DEGREES
T OFFSET: 434 MS
VENTRICULAR RATE: 61 BPM

## 2024-09-20 NOTE — PROGRESS NOTES
Facial Plastic & Reconstructive Surgery      POV s/p nasal surgery on 9/17/24    Doing well, endorses improved breathing bilaterally  Continues salt water sprays and ointment to nares    Nasal splint removed  Septum midline  Nasal airway patent  Incisions c/d/I    Plan:  Continue nasal saline sprays and ointment to nares  RTC 4-6 months or sooner if needed     Marilyn Borrego PA-C

## 2024-09-23 ENCOUNTER — APPOINTMENT (OUTPATIENT)
Dept: OTOLARYNGOLOGY | Facility: CLINIC | Age: 24
End: 2024-09-23
Payer: COMMERCIAL

## 2024-09-23 VITALS — WEIGHT: 144.6 LBS | TEMPERATURE: 97.2 F | BODY MASS INDEX: 26.61 KG/M2 | HEIGHT: 62 IN

## 2024-09-23 DIAGNOSIS — J34.89 NASAL OBSTRUCTION: ICD-10-CM

## 2024-09-23 DIAGNOSIS — J34.2 DEVIATED SEPTUM: ICD-10-CM

## 2024-09-23 DIAGNOSIS — M26.69 OTHER SPECIFIED DISORDERS OF TEMPOROMANDIBULAR JOINT: ICD-10-CM

## 2024-09-23 DIAGNOSIS — J34.2 DEVIATED NASAL SEPTUM: ICD-10-CM

## 2024-09-23 DIAGNOSIS — M95.0 NASAL DEFORMITY: Primary | ICD-10-CM

## 2024-09-23 PROCEDURE — 3008F BODY MASS INDEX DOCD: CPT | Performed by: PHYSICIAN ASSISTANT

## 2024-09-23 PROCEDURE — 1036F TOBACCO NON-USER: CPT | Performed by: PHYSICIAN ASSISTANT

## 2024-09-23 PROCEDURE — 99024 POSTOP FOLLOW-UP VISIT: CPT | Performed by: PHYSICIAN ASSISTANT

## 2024-09-23 ASSESSMENT — PATIENT HEALTH QUESTIONNAIRE - PHQ9
SUM OF ALL RESPONSES TO PHQ9 QUESTIONS 1 AND 2: 0
2. FEELING DOWN, DEPRESSED OR HOPELESS: NOT AT ALL
1. LITTLE INTEREST OR PLEASURE IN DOING THINGS: NOT AT ALL

## 2024-09-23 ASSESSMENT — PAIN SCALES - GENERAL: PAINLEVEL: 0-NO PAIN

## 2024-12-06 ENCOUNTER — PHARMACY VISIT (OUTPATIENT)
Dept: PHARMACY | Facility: CLINIC | Age: 24
End: 2024-12-06
Payer: COMMERCIAL

## 2024-12-06 PROCEDURE — RXMED WILLOW AMBULATORY MEDICATION CHARGE

## 2025-02-06 ENCOUNTER — APPOINTMENT (OUTPATIENT)
Dept: BEHAVIORAL HEALTH | Facility: CLINIC | Age: 25
End: 2025-02-06
Payer: COMMERCIAL

## 2025-02-06 DIAGNOSIS — F32.9 REACTIVE DEPRESSION: ICD-10-CM

## 2025-02-06 PROCEDURE — 99212 OFFICE O/P EST SF 10 MIN: CPT | Performed by: PSYCHIATRY & NEUROLOGY

## 2025-02-06 RX ORDER — SERTRALINE HYDROCHLORIDE 100 MG/1
100 TABLET, FILM COATED ORAL DAILY
Qty: 90 TABLET | Refills: 0 | Status: SHIPPED | OUTPATIENT
Start: 2025-02-06 | End: 2025-05-07

## 2025-03-20 ENCOUNTER — PHARMACY VISIT (OUTPATIENT)
Dept: PHARMACY | Facility: CLINIC | Age: 25
End: 2025-03-20
Payer: COMMERCIAL

## 2025-03-20 ENCOUNTER — OFFICE VISIT (OUTPATIENT)
Dept: PRIMARY CARE | Facility: CLINIC | Age: 25
End: 2025-03-20
Payer: COMMERCIAL

## 2025-03-20 VITALS
BODY MASS INDEX: 26.29 KG/M2 | SYSTOLIC BLOOD PRESSURE: 110 MMHG | HEIGHT: 64 IN | HEART RATE: 92 BPM | OXYGEN SATURATION: 97 % | RESPIRATION RATE: 16 BRPM | DIASTOLIC BLOOD PRESSURE: 70 MMHG | WEIGHT: 154 LBS

## 2025-03-20 DIAGNOSIS — F17.200 NICOTINE DEPENDENCE WITH CURRENT USE: ICD-10-CM

## 2025-03-20 DIAGNOSIS — Z00.00 ROUTINE GENERAL MEDICAL EXAMINATION AT A HEALTH CARE FACILITY: Primary | ICD-10-CM

## 2025-03-20 DIAGNOSIS — K21.9 GERD WITHOUT ESOPHAGITIS: ICD-10-CM

## 2025-03-20 PROCEDURE — RXMED WILLOW AMBULATORY MEDICATION CHARGE

## 2025-03-20 RX ORDER — OMEPRAZOLE 40 MG/1
40 CAPSULE, DELAYED RELEASE ORAL
Qty: 30 CAPSULE | Refills: 1 | Status: SHIPPED | OUTPATIENT
Start: 2025-03-20 | End: 2026-03-15

## 2025-03-20 ASSESSMENT — ENCOUNTER SYMPTOMS
LOSS OF SENSATION IN FEET: 0
COUGH: 0
DYSURIA: 0
NERVOUS/ANXIOUS: 0
HEADACHES: 1
SORE THROAT: 0
ABDOMINAL PAIN: 0
FEVER: 0
ARTHRALGIAS: 0
CONSTIPATION: 0
SINUS PAIN: 0
HEMATURIA: 0
CHILLS: 0
DIZZINESS: 0
SLEEP DISTURBANCE: 0
FATIGUE: 0
PALPITATIONS: 0
DIARRHEA: 0
RHINORRHEA: 0
SHORTNESS OF BREATH: 0
JOINT SWELLING: 0
WEAKNESS: 0
DEPRESSION: 0
ROS GI COMMENTS: GERD
APPETITE CHANGE: 0
DIFFICULTY URINATING: 0
NAUSEA: 0
VOMITING: 0
OCCASIONAL FEELINGS OF UNSTEADINESS: 0
FREQUENCY: 0

## 2025-03-20 ASSESSMENT — PATIENT HEALTH QUESTIONNAIRE - PHQ9
1. LITTLE INTEREST OR PLEASURE IN DOING THINGS: NOT AT ALL
2. FEELING DOWN, DEPRESSED OR HOPELESS: NOT AT ALL
SUM OF ALL RESPONSES TO PHQ9 QUESTIONS 1 AND 2: 0

## 2025-03-20 ASSESSMENT — PAIN SCALES - GENERAL: PAINLEVEL_OUTOF10: 0-NO PAIN

## 2025-03-20 NOTE — PROGRESS NOTES
"Subjective   Patient ID: Cynthia Brooks is a 24 y.o. female who presents for Annual Exam.    For the past month, patient has been experiencing heartburn. Can be temporarily alleviated with TUMS. Currently is vaping nicotine and socially drinks alcohol. Sees an obgyn. Had a recent septoplasty.     Diagnostics Reviewed:  Labs Reviewed: 6/12/2024 PAP: slightly abnormal. Repeat in June 2025.          Review of Systems   Constitutional:  Negative for appetite change, chills, fatigue and fever.   HENT:  Negative for ear pain, rhinorrhea, sinus pain and sore throat.    Eyes:  Negative for visual disturbance.   Respiratory:  Negative for cough and shortness of breath.    Cardiovascular:  Negative for chest pain and palpitations.   Gastrointestinal:  Negative for abdominal pain, constipation, diarrhea, nausea and vomiting.        GERD   Genitourinary:  Negative for difficulty urinating, dysuria, frequency and hematuria.   Musculoskeletal:  Negative for arthralgias and joint swelling.   Skin:  Negative for rash.   Neurological:  Positive for headaches (occ). Negative for dizziness and weakness.   Psychiatric/Behavioral:  Negative for sleep disturbance. The patient is not nervous/anxious.        Objective   /70   Pulse 92   Resp 16   Ht 1.632 m (5' 4.25\")   Wt 69.9 kg (154 lb)   LMP 03/10/2025   SpO2 97%   BMI 26.23 kg/m²     Physical Exam  HENT:      Right Ear: Tympanic membrane normal. There is no impacted cerumen.      Left Ear: Tympanic membrane normal. There is no impacted cerumen.      Mouth/Throat:      Pharynx: Oropharynx is clear. No oropharyngeal exudate.   Eyes:      Conjunctiva/sclera: Conjunctivae normal.      Pupils: Pupils are equal, round, and reactive to light.   Neck:      Thyroid: No thyromegaly.      Vascular: No carotid bruit.   Cardiovascular:      Rate and Rhythm: Regular rhythm.      Heart sounds: Normal heart sounds.   Pulmonary:      Breath sounds: Normal breath sounds.   Abdominal:      " Palpations: Abdomen is soft. There is no hepatomegaly.      Tenderness: There is no abdominal tenderness.   Musculoskeletal:      Right lower leg: No edema.      Left lower leg: No edema.   Lymphadenopathy:      Cervical: No cervical adenopathy.   Skin:     General: Skin is warm.      Comments: No suspicious moles   Neurological:      Mental Status: She is alert and oriented to person, place, and time.      Gait: Gait is intact.   Psychiatric:         Mood and Affect: Mood and affect normal.         Behavior: Behavior normal. Behavior is cooperative.         Cognition and Memory: Cognition normal.         Assessment/Plan   Diagnoses and all orders for this visit:  Routine general medical examination at a [facility] care facility  GERD without esophagitis  -     H. Pylori Antigen, Stool; Future  -     omeprazole (PriLOSEC) 40 mg DR capsule; Take 1 capsule (40 mg) by mouth once daily in the morning. Take before meals. Do not crush or chew.  -     FL upper GI w KUB; Future  Nicotine dependence with current use  Counseled on smoking cessation including behavior modification, medications recommendations discussed with patient, total spent 3 minutes     Scribe Attestation  By signing my name below, I, Irvin Mon, Scribastrid   attest that this documentation has been prepared under the direction and in the presence of Mildred Zaidi MD.

## 2025-03-21 ENCOUNTER — TELEPHONE (OUTPATIENT)
Dept: OBSTETRICS AND GYNECOLOGY | Facility: CLINIC | Age: 25
End: 2025-03-21
Payer: COMMERCIAL

## 2025-03-21 NOTE — TELEPHONE ENCOUNTER
Pt calling complaining of burning with urination, urgency and frequency for 3 days. Advised patient rx for macrobid will be called to her local pharmacy. Advised patient to increase her water intake and to call the office if symptoms persist. Patient agreed

## 2025-04-01 LAB — H PYLORI AG STL QL IA: NORMAL

## 2025-04-14 ENCOUNTER — HOSPITAL ENCOUNTER (OUTPATIENT)
Dept: RADIOLOGY | Facility: HOSPITAL | Age: 25
Discharge: HOME | End: 2025-04-14
Payer: COMMERCIAL

## 2025-04-14 DIAGNOSIS — K21.9 GERD WITHOUT ESOPHAGITIS: ICD-10-CM

## 2025-04-14 PROCEDURE — 74240 X-RAY XM UPR GI TRC 1CNTRST: CPT

## 2025-04-14 PROCEDURE — 2500000005 HC RX 250 GENERAL PHARMACY W/O HCPCS: Performed by: INTERNAL MEDICINE

## 2025-04-14 PROCEDURE — 74240 X-RAY XM UPR GI TRC 1CNTRST: CPT | Performed by: RADIOLOGY

## 2025-04-14 PROCEDURE — 2500000001 HC RX 250 WO HCPCS SELF ADMINISTERED DRUGS (ALT 637 FOR MEDICARE OP): Performed by: INTERNAL MEDICINE

## 2025-04-14 PROCEDURE — A9698 NON-RAD CONTRAST MATERIALNOC: HCPCS | Performed by: INTERNAL MEDICINE

## 2025-04-14 RX ADMIN — BARIUM SULFATE 150 ML: 0.6 SUSPENSION ORAL at 11:07

## 2025-04-14 RX ADMIN — BARIUM SULFATE 80 ML: 980 POWDER, FOR SUSPENSION ORAL at 10:06

## 2025-04-14 RX ADMIN — ANTACID/ANTIFLATULENT 1 PACKET: 380; 550; 10; 10 GRANULE, EFFERVESCENT ORAL at 11:05

## 2025-04-24 ENCOUNTER — OFFICE VISIT (OUTPATIENT)
Dept: PRIMARY CARE | Facility: CLINIC | Age: 25
End: 2025-04-24
Payer: COMMERCIAL

## 2025-04-24 VITALS
HEART RATE: 94 BPM | RESPIRATION RATE: 16 BRPM | OXYGEN SATURATION: 97 % | HEIGHT: 64 IN | DIASTOLIC BLOOD PRESSURE: 68 MMHG | SYSTOLIC BLOOD PRESSURE: 110 MMHG | WEIGHT: 151 LBS | BODY MASS INDEX: 25.78 KG/M2

## 2025-04-24 DIAGNOSIS — K21.9 GERD WITHOUT ESOPHAGITIS: Primary | ICD-10-CM

## 2025-04-24 PROCEDURE — 3008F BODY MASS INDEX DOCD: CPT | Performed by: INTERNAL MEDICINE

## 2025-04-24 PROCEDURE — 99213 OFFICE O/P EST LOW 20 MIN: CPT | Performed by: INTERNAL MEDICINE

## 2025-04-24 RX ORDER — OMEPRAZOLE 20 MG/1
20 CAPSULE, DELAYED RELEASE ORAL
Qty: 30 CAPSULE | Refills: 1 | Status: SHIPPED | OUTPATIENT
Start: 2025-04-24 | End: 2026-04-24

## 2025-04-24 ASSESSMENT — ENCOUNTER SYMPTOMS
CONSTIPATION: 0
OCCASIONAL FEELINGS OF UNSTEADINESS: 0
COUGH: 0
ARTHRALGIAS: 0
LOSS OF SENSATION IN FEET: 0
SHORTNESS OF BREATH: 0
PALPITATIONS: 0
NAUSEA: 0
ABDOMINAL PAIN: 0
DIZZINESS: 0
DIARRHEA: 0
DEPRESSION: 0

## 2025-04-24 ASSESSMENT — PAIN SCALES - GENERAL: PAINLEVEL_OUTOF10: 0-NO PAIN

## 2025-04-24 NOTE — PROGRESS NOTES
"Subjective   Patient ID: Cynthia Brooks is a 24 y.o. female who presents for 1 month check up.    Patient is doing well overall. Stated that the omeprazole has been helping tremendously with her heartburn. Doesn't have to use TUMS anymore. Recently, cut back on vaping. Occasionally drinks alcohol. Drinks caffeine.     Diagnostics Reviewed: 4/14/2025 FL Upper GI: was normal.   Labs Reviewed:         Review of Systems   Respiratory:  Negative for cough and shortness of breath.    Cardiovascular:  Negative for chest pain and palpitations.   Gastrointestinal:  Negative for abdominal pain, constipation, diarrhea and nausea.   Musculoskeletal:  Negative for arthralgias.   Neurological:  Negative for dizziness.       Objective   /68   Pulse 94   Resp 16   Ht 1.632 m (5' 4.25\")   Wt 68.5 kg (151 lb)   LMP 04/04/2025   SpO2 97%   BMI 25.72 kg/m²     Physical Exam  Cardiovascular:      Rate and Rhythm: Normal rate and regular rhythm.      Heart sounds: Normal heart sounds.   Pulmonary:      Breath sounds: Normal breath sounds.   Abdominal:      Palpations: Abdomen is soft. There is no hepatomegaly.      Tenderness: There is abdominal tenderness.   Musculoskeletal:      Right lower leg: No edema.      Left lower leg: No edema.   Neurological:      Mental Status: She is alert and oriented to person, place, and time.      Gait: Gait normal.   Psychiatric:         Mood and Affect: Mood normal.         Behavior: Behavior normal.         Assessment/Plan   Diagnoses and all orders for this visit:  GERD without esophagitis  -     omeprazole (PriLOSEC) 20 mg DR capsule; Take 1 capsule (20 mg) by mouth once daily in the morning. Take before meals. Do not crush or chew.  -     Referral to Gastroenterology; Future      Scribe Attestation  By signing my name below, IIrvin Scribe   attest that this documentation has been prepared under the direction and in the presence of Mildred Zaidi MD.     "

## 2025-04-24 NOTE — PATIENT INSTRUCTIONS
Limit caffeine, nicotine, alcohol and foods contain mint/fat.  Only use gum that doesn't use mint.

## 2025-05-05 ENCOUNTER — APPOINTMENT (OUTPATIENT)
Dept: BEHAVIORAL HEALTH | Facility: CLINIC | Age: 25
End: 2025-05-05

## 2025-05-05 DIAGNOSIS — F32.9 REACTIVE DEPRESSION: ICD-10-CM

## 2025-05-05 PROCEDURE — 99213 OFFICE O/P EST LOW 20 MIN: CPT | Performed by: PSYCHIATRY & NEUROLOGY

## 2025-05-05 NOTE — PROGRESS NOTES
"Outpatient Child and Adolescent Psychiatry      Subjective   Cynthia Brooks, a 24 y.o. female, for Follow-up visit.      Assessment/Plan   Diagnosis:   Patient Active Problem List   Diagnosis    Abnormal brain MRI    Acne vulgaris    Arthritis of big toe    Bunion of great toe of left foot    Daytime sleepiness    Delayed menses    Exercise-induced asthma    Infection    Limb weakness    Migraine with aura and without status migrainosus, not intractable    Numbness of arm    Reactive depression    Superficial thrombophlebitis    Surveillance of implantable subdermal contraceptive    Other problems related to lifestyle    Irregular menses    Dysuria    Screening for cervical cancer    Nasal congestion    Deviated nasal septum    Hypertrophy of nasal turbinates    Nasal deformity    Deviated septum    Other specified disorders of temporomandibular joint    Nasal obstruction       Treatment Goals:  Specify outcomes written in observable, behavioral terms:   Anxiety, depression    Treatment Plan/Recommendations:   Cont Zoloft 100 mg QD targetting anxiety and depression. ( RF provided)  Cont therapy.  Follow-up plan for depression was discussed with patient.      Reason for Visit:       HPI:     Living with parents few times a week and other half with BF. Relationship is going well. Some family drama with aunt and uncle ( getting ).   School- graduated in college. 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     Per patient things are \"Good\". Family is a more dramatic. Aunt and  divorce issues. Grandparents live with aunt in the in law suite, helped them financial. Its on her parents to help them.  are involved. Parents have gotten into fights.     Work- going good. Likes it. Settling in and smoothly.  With BF- good.   Sometimes hang out with friends at [employer]. Have family movie night sometimes.     Current Medications:    Current Outpatient Medications:     " "drospirenone, contraceptive, 4 mg (28) tablet, Take 1 tablet by mouth once daily., Disp: 84 tablet, Rfl: 3    omeprazole (PriLOSEC) 20 mg DR capsule, Take 1 capsule (20 mg) by mouth once daily in the morning. Take before meals. Do not crush or chew., Disp: 30 capsule, Rfl: 1    sertraline (Zoloft) 100 mg tablet, TAKE 1 TABLET (100 MG) BY MOUTH ONCE DAILY, Disp: 90 tablet, Rfl: 0    Record Review: brief     Medical Review Of Systems:  A comprehensive review of systems was negative.    Psychiatric Review Of Systems:  Depressive Symptoms: stable  Anxiety Symptoms: Feels overwhelmed, dx wih GERD, Had to get endoscopy.  Moving to [facility] with a co-worker.  Panic attacks- mini one once   Coping- reading again, taking deep breaths, if at home  Disordered Eating Symptoms: denies  Inattentive Symptoms: good  Energy- overall good  Hyperactive/Impulsive Symptoms: n/a  Sleep- very well.  Appetite- good         Objective     Mental Status Exam:   MSE:  Appearance: Appears stated age. Wearing street clothes with fair grooming and hygiene.  Behavior: Calm, cooperative. Appropriate eye contact.  Speech: Normal rate, rhythm and volume.  Motor: No PMA or PMR. No abnormal movements noted.  Mood: \"Fine\"  Affect:  euthymic  Thought Process: Linear, logical and goal oriented. Associations are logical.  Thought Content: Does not endorse suicidal or homicidal ideation, no delusions elicited  Perception: Does not endorse auditory or visual hallucinations, does  not appear to be responding to hallucinatory stimuli  Cognition: Alert and oriented x 3, concentration fair, adequate fund of knowledge. Language intact.  Insight: Fair, in regards to mental illness  Judgment: Fair, in regards to ability to make sound decision          Review with patient: Treatment plan reviewed with the patient.  Medication risks/benefit reviewed with the patient    Time spent in therapy 5  Total time spent 15    Falguni Smith MD    "

## 2025-06-13 DIAGNOSIS — G43.001 MIGRAINE WITHOUT AURA AND WITH STATUS MIGRAINOSUS, NOT INTRACTABLE: ICD-10-CM

## 2025-06-13 DIAGNOSIS — L70.0 ACNE VULGARIS: ICD-10-CM

## 2025-06-13 DIAGNOSIS — N92.6 IRREGULAR MENSES: ICD-10-CM

## 2025-06-13 RX ORDER — DROSPIRENONE 4 MG/1
4 TABLET, FILM COATED ORAL DAILY
Qty: 84 TABLET | Refills: 3 | Status: SHIPPED | OUTPATIENT
Start: 2025-06-13

## 2025-06-13 ASSESSMENT — ENCOUNTER SYMPTOMS
FATIGUE: 0
ABDOMINAL PAIN: 0
CHEST TIGHTNESS: 0
DYSURIA: 0
JOINT SWELLING: 0
COLOR CHANGE: 0
DIZZINESS: 0
ACTIVITY CHANGE: 0
WEAKNESS: 0
DIFFICULTY URINATING: 0
ADENOPATHY: 0
UNEXPECTED WEIGHT CHANGE: 0
SHORTNESS OF BREATH: 0
HEADACHES: 0
ABDOMINAL DISTENTION: 0

## 2025-06-13 NOTE — PROGRESS NOTES
"Annual  Subjective   Cynthia Brooks is a 24 y.o. female who is here for a routine exam/rpt pap.   Complaints:   Needs refill OCPs; denies any headaches; would like STD screening with her Pap.  Periods: regular/no problem  Contraception: Hx menstrual headaches with estrogen use ; history of frequent bleeding/increased acne on Nexplanon ; currently using Slynd  Hx abn Pap 2023/2024=lgsil; ALL HR hpv neg  PMHx: Anxiety/depression; GERD; lgsil; menstrual h/a  Review of Systems   Constitutional:  Negative for activity change, fatigue and unexpected weight change.   Respiratory:  Negative for chest tightness and shortness of breath.    Cardiovascular:  Negative for chest pain and leg swelling.   Gastrointestinal:  Negative for abdominal distention and abdominal pain.   Genitourinary:  Negative for difficulty urinating, dysuria, genital sores, pelvic pain, vaginal bleeding, vaginal discharge and vaginal pain.   Musculoskeletal:  Negative for gait problem and joint swelling.   Skin:  Negative for color change and rash.   Neurological:  Negative for dizziness, weakness and headaches.   Hematological:  Negative for adenopathy.   Objective Visit Vitals  /70   Ht 1.626 m (5' 4\")   Wt 69.9 kg (154 lb)   LMP 06/09/2025 (Exact Date)   BMI 26.43 kg/m²   OB Status Having periods   Smoking Status Never   BSA 1.78 m²       General:   Alert and oriented, in no acute distress   Neck: Supple. No visible thyromegaly.    Breast/Axilla: Normal to palpation bilaterally without masses, skin changes, or nipple discharge.    Abdomen: Soft, non-tender, without masses or organomegaly   Vulva: Normal architecture without erythema, masses, or lesions.    Vagina: Normal mucosa without lesions, masses, or atrophy. No abnormal vaginal discharge.  Upper vault with small amount of tannish/brown discharge.   Cervix: Normal without masses, lesions, or signs of cervicitis; Pap sent   Uterus: Normal, mobile, non-enlarged uterus   Adnexa: Normal " without masses or lesions   Pelvic Floor normal   Psych Normal affect. Normal mood.    Assessment/Plan   Encounter Diagnoses   Name Primary?    Visit for gynecologic examination; no suspicious findings on current breast or pelvic exam. Yes    Contraceptive education; options reviewed; patient currently most content on progesterone only pill     Pap smear to confirm normal after abnormal result; Pap sent          Migraine without aura and with status migrainosus, not intractable; resolved off estrogen     Acne vulgaris; improved on Slynd     Screen for STD (sexually transmitted disease);  chlamydia screen ordered as part of Pap.       Mirta Hughes MD

## 2025-06-19 ENCOUNTER — OFFICE VISIT (OUTPATIENT)
Dept: OBSTETRICS AND GYNECOLOGY | Facility: CLINIC | Age: 25
End: 2025-06-19
Payer: COMMERCIAL

## 2025-06-19 VITALS
SYSTOLIC BLOOD PRESSURE: 102 MMHG | WEIGHT: 154 LBS | HEIGHT: 64 IN | BODY MASS INDEX: 26.29 KG/M2 | DIASTOLIC BLOOD PRESSURE: 70 MMHG

## 2025-06-19 DIAGNOSIS — Z01.42 PAP SMEAR TO CONFIRM NORMAL AFTER ABNORMAL RESULT: ICD-10-CM

## 2025-06-19 DIAGNOSIS — Z30.09 CONTRACEPTIVE EDUCATION: ICD-10-CM

## 2025-06-19 DIAGNOSIS — Z11.3 SCREEN FOR STD (SEXUALLY TRANSMITTED DISEASE): ICD-10-CM

## 2025-06-19 DIAGNOSIS — L70.0 ACNE VULGARIS: ICD-10-CM

## 2025-06-19 DIAGNOSIS — N92.6 IRREGULAR MENSES: ICD-10-CM

## 2025-06-19 DIAGNOSIS — G43.001 MIGRAINE WITHOUT AURA AND WITH STATUS MIGRAINOSUS, NOT INTRACTABLE: ICD-10-CM

## 2025-06-19 DIAGNOSIS — Z01.419 VISIT FOR GYNECOLOGIC EXAMINATION: Primary | ICD-10-CM

## 2025-06-19 PROCEDURE — 3008F BODY MASS INDEX DOCD: CPT | Performed by: OBSTETRICS & GYNECOLOGY

## 2025-06-19 PROCEDURE — 1036F TOBACCO NON-USER: CPT | Performed by: OBSTETRICS & GYNECOLOGY

## 2025-06-19 PROCEDURE — 99395 PREV VISIT EST AGE 18-39: CPT | Performed by: OBSTETRICS & GYNECOLOGY

## 2025-06-19 PROCEDURE — 87491 CHLMYD TRACH DNA AMP PROBE: CPT | Performed by: OBSTETRICS & GYNECOLOGY

## 2025-06-19 RX ORDER — DROSPIRENONE 4 MG/1
4 TABLET, FILM COATED ORAL DAILY
Qty: 84 TABLET | Refills: 3 | Status: SHIPPED | OUTPATIENT
Start: 2025-06-19

## 2025-06-19 ASSESSMENT — LIFESTYLE VARIABLES
AUDIT-C TOTAL SCORE: 3
HOW MANY STANDARD DRINKS CONTAINING ALCOHOL DO YOU HAVE ON A TYPICAL DAY: 1 OR 2
HOW OFTEN DO YOU HAVE SIX OR MORE DRINKS ON ONE OCCASION: NEVER
SKIP TO QUESTIONS 9-10: 1
HOW OFTEN DO YOU HAVE A DRINK CONTAINING ALCOHOL: 2-3 TIMES A WEEK

## 2025-06-19 ASSESSMENT — PAIN SCALES - GENERAL: PAINLEVEL_OUTOF10: 0-NO PAIN

## 2025-06-19 ASSESSMENT — ENCOUNTER SYMPTOMS
DEPRESSION: 0
LOSS OF SENSATION IN FEET: 0
OCCASIONAL FEELINGS OF UNSTEADINESS: 0

## 2025-06-21 LAB
C TRACH RRNA SPEC QL NAA+PROBE: NEGATIVE
N GONORRHOEA DNA SPEC QL PROBE+SIG AMP: NEGATIVE

## 2025-07-18 LAB
CYTOLOGY CMNT CVX/VAG CYTO-IMP: NORMAL
HPV HR 12 DNA GENITAL QL NAA+PROBE: NEGATIVE
HPV HR GENOTYPES PNL CVX NAA+PROBE: NEGATIVE
HPV16 DNA SPEC QL NAA+PROBE: NEGATIVE
HPV18 DNA SPEC QL NAA+PROBE: NEGATIVE
LAB AP CONTRACEPTIVE HISTORY: NORMAL
LAB AP HPV GENOTYPE QUESTION: YES
LAB AP HPV HR: NORMAL
LAB AP PAP ADDITIONAL TESTS: NORMAL
LAB AP PREVIOUS ABNORMAL HISTORY: NORMAL
LABORATORY COMMENT REPORT: NORMAL
LMP START DATE: NORMAL
PATH REPORT.TOTAL CANCER: NORMAL

## 2025-08-11 ENCOUNTER — APPOINTMENT (OUTPATIENT)
Dept: BEHAVIORAL HEALTH | Facility: CLINIC | Age: 25
End: 2025-08-11
Payer: COMMERCIAL

## 2025-08-11 DIAGNOSIS — F32.9 REACTIVE DEPRESSION: ICD-10-CM

## 2025-08-11 PROCEDURE — 99214 OFFICE O/P EST MOD 30 MIN: CPT | Performed by: PSYCHIATRY & NEUROLOGY

## 2025-08-11 RX ORDER — HYDROXYZINE PAMOATE 25 MG/1
25 CAPSULE ORAL 3 TIMES DAILY PRN
Qty: 90 CAPSULE | Refills: 1 | Status: SHIPPED | OUTPATIENT
Start: 2025-08-11 | End: 2025-08-11

## 2025-08-11 RX ORDER — SERTRALINE HYDROCHLORIDE 100 MG/1
100 TABLET, FILM COATED ORAL DAILY
Qty: 90 TABLET | Refills: 0 | Status: SHIPPED | OUTPATIENT
Start: 2025-08-11 | End: 2025-08-11

## 2025-08-11 RX ORDER — SERTRALINE HYDROCHLORIDE 100 MG/1
100 TABLET, FILM COATED ORAL DAILY
Qty: 90 TABLET | Refills: 0 | Status: SHIPPED | OUTPATIENT
Start: 2025-08-11

## 2025-08-11 RX ORDER — HYDROXYZINE PAMOATE 25 MG/1
25 CAPSULE ORAL 3 TIMES DAILY PRN
Qty: 90 CAPSULE | Refills: 1 | Status: SHIPPED | OUTPATIENT
Start: 2025-08-11 | End: 2025-10-10

## 2025-09-18 ENCOUNTER — APPOINTMENT (OUTPATIENT)
Dept: BEHAVIORAL HEALTH | Facility: CLINIC | Age: 25
End: 2025-09-18
Payer: COMMERCIAL

## (undated) DEVICE — GLOVE, SURGICAL, PROTEXIS PI BLUE W/NEUTHERA, 7.5, PF, LF

## (undated) DEVICE — SUTURE, CHROMIC GUT 5/0  18  P-13"

## (undated) DEVICE — SPONGE, GAUZE, XRAY DECT, 16 PLY, 4 X 4, W/MASTER DMT,STERILE

## (undated) DEVICE — Device

## (undated) DEVICE — SYRINGE, 20 CC, LUER LOCK, MONOJECT, W/O CAP, LF

## (undated) DEVICE — DRILL TIP, PIEZOTOME M+, RHS6

## (undated) DEVICE — NEEDLE, MICRODISSECTION STR 4CM

## (undated) DEVICE — NEEDLE, HYPODERMIC, REGULAR WALL, REGULAR BEVEL, 18 G X 1.5 IN

## (undated) DEVICE — ADHESIVE, SKIN, MASTISOL, 2/3 CC VIAL

## (undated) DEVICE — CLEANER, WIPE, INSTRUMENT, 3.25 X 3.25 IN

## (undated) DEVICE — STRIP, SKIN CLOSURE, STERI STRIP, REINFORCED, 0.5 X 4 IN

## (undated) DEVICE — CATHETER, DRAINAGE, NASOGASTRIC, SUMP, SALEM, W/ANTI-REFLUX VALVE, 18 FR, 48 IN

## (undated) DEVICE — APPLICATOR, COTTON TIP, 3 IN, WOOD, 10-PACK, STERILE

## (undated) DEVICE — SYRINGE, 10 CC, LUER LOCK

## (undated) DEVICE — CONTAINER STERILE SPECIMEN 90ML, STERILE

## (undated) DEVICE — SYRINGE, MONOJECT, LUER LOCK, 3 CC, LF

## (undated) DEVICE — MARKER, SKIN, REGULAR TIP, W/W/FLEXI RULER, LABEL